# Patient Record
Sex: MALE | Race: WHITE | Employment: FULL TIME | URBAN - NONMETROPOLITAN AREA
[De-identification: names, ages, dates, MRNs, and addresses within clinical notes are randomized per-mention and may not be internally consistent; named-entity substitution may affect disease eponyms.]

---

## 2017-08-07 ENCOUNTER — ANESTHESIA EVENT (OUTPATIENT)
Dept: EMERGENCY MEDICINE | Facility: HOSPITAL | Age: 42
End: 2017-08-07
Payer: COMMERCIAL

## 2017-08-07 ENCOUNTER — HOSPITAL ENCOUNTER (EMERGENCY)
Facility: HOSPITAL | Age: 42
Discharge: HOME | End: 2017-08-07
Payer: COMMERCIAL

## 2017-08-07 ENCOUNTER — ANESTHESIA (OUTPATIENT)
Dept: EMERGENCY MEDICINE | Facility: HOSPITAL | Age: 42
End: 2017-08-07
Payer: COMMERCIAL

## 2017-08-07 VITALS
DIASTOLIC BLOOD PRESSURE: 79 MMHG | WEIGHT: 180 LBS | RESPIRATION RATE: 14 BRPM | SYSTOLIC BLOOD PRESSURE: 135 MMHG | TEMPERATURE: 98.4 F | OXYGEN SATURATION: 100 %

## 2017-08-07 DIAGNOSIS — M79.10 MYALGIA: ICD-10-CM

## 2017-08-07 DIAGNOSIS — R50.9 FEVER, UNSPECIFIED: ICD-10-CM

## 2017-08-07 LAB
ALBUMIN SERPL-MCNC: 3.6 G/DL (ref 3.4–5)
ALBUMIN UR-MCNC: 30 MG/DL
ALP SERPL-CCNC: 88 U/L (ref 40–150)
ALT SERPL W P-5'-P-CCNC: 58 U/L (ref 0–70)
ANION GAP SERPL CALCULATED.3IONS-SCNC: 6 MMOL/L (ref 3–14)
APPEARANCE CSF: CLEAR
APPEARANCE UR: CLEAR
AST SERPL W P-5'-P-CCNC: 43 U/L (ref 0–45)
BACTERIA #/AREA URNS HPF: ABNORMAL /HPF
BASOPHILS # BLD AUTO: 0 10E9/L (ref 0–0.2)
BASOPHILS NFR BLD AUTO: 0.5 %
BILIRUB SERPL-MCNC: 1.1 MG/DL (ref 0.2–1.3)
BILIRUB UR QL STRIP: NEGATIVE
BUN SERPL-MCNC: 20 MG/DL (ref 7–30)
CALCIUM SERPL-MCNC: 8.4 MG/DL (ref 8.5–10.1)
CHLORIDE SERPL-SCNC: 103 MMOL/L (ref 94–109)
CO2 SERPL-SCNC: 26 MMOL/L (ref 20–32)
COLOR CSF: COLORLESS
COLOR UR AUTO: YELLOW
CREAT SERPL-MCNC: 0.76 MG/DL (ref 0.66–1.25)
CRP SERPL-MCNC: 88 MG/L (ref 0–8)
DEPRECATED S PYO AG THROAT QL EIA: NORMAL
DIFFERENTIAL METHOD BLD: ABNORMAL
EOSINOPHIL # BLD AUTO: 0 10E9/L (ref 0–0.7)
EOSINOPHIL NFR BLD AUTO: 0.9 %
ERYTHROCYTE [DISTWIDTH] IN BLOOD BY AUTOMATED COUNT: 12.1 % (ref 10–15)
GFR SERPL CREATININE-BSD FRML MDRD: ABNORMAL ML/MIN/1.7M2
GLUCOSE CSF-MCNC: 63 MG/DL (ref 40–70)
GLUCOSE SERPL-MCNC: 116 MG/DL (ref 70–99)
GLUCOSE UR STRIP-MCNC: 300 MG/DL
GRAM STN SPEC: NORMAL
HCT VFR BLD AUTO: 41.2 % (ref 40–53)
HGB BLD-MCNC: 14.9 G/DL (ref 13.3–17.7)
HGB UR QL STRIP: NEGATIVE
IMM GRANULOCYTES # BLD: 0 10E9/L (ref 0–0.4)
IMM GRANULOCYTES NFR BLD: 0.5 %
KETONES UR STRIP-MCNC: NEGATIVE MG/DL
LACTATE SERPL-SCNC: 1.5 MMOL/L (ref 0.4–2)
LEUKOCYTE ESTERASE UR QL STRIP: NEGATIVE
LYMPHOCYTES # BLD AUTO: 0.5 10E9/L (ref 0.8–5.3)
LYMPHOCYTES NFR BLD AUTO: 12.2 %
MCH RBC QN AUTO: 30.9 PG (ref 26.5–33)
MCHC RBC AUTO-ENTMCNC: 36.2 G/DL (ref 31.5–36.5)
MCV RBC AUTO: 86 FL (ref 78–100)
MICRO REPORT STATUS: NORMAL
MICRO REPORT STATUS: NORMAL
MONOCYTES # BLD AUTO: 0.6 10E9/L (ref 0–1.3)
MONOCYTES NFR BLD AUTO: 13.3 %
MUCOUS THREADS #/AREA URNS LPF: PRESENT /LPF
NEUTROPHILS # BLD AUTO: 3.2 10E9/L (ref 1.6–8.3)
NEUTROPHILS NFR BLD AUTO: 72.6 %
NITRATE UR QL: NEGATIVE
NRBC # BLD AUTO: 0 10*3/UL
NRBC BLD AUTO-RTO: 0 /100
PH UR STRIP: 5.5 PH (ref 4.7–8)
PLATELET # BLD AUTO: 138 10E9/L (ref 150–450)
POTASSIUM SERPL-SCNC: 4.2 MMOL/L (ref 3.4–5.3)
PROT CSF-MCNC: 47 MG/DL (ref 15–60)
PROT SERPL-MCNC: 7 G/DL (ref 6.8–8.8)
RBC # BLD AUTO: 4.82 10E12/L (ref 4.4–5.9)
RBC # CSF MANUAL: 0 /UL (ref 0–2)
RBC #/AREA URNS AUTO: 1 /HPF (ref 0–2)
SODIUM SERPL-SCNC: 135 MMOL/L (ref 133–144)
SP GR UR STRIP: 1.02 (ref 1–1.03)
SPECIMEN SOURCE: NORMAL
SPECIMEN SOURCE: NORMAL
TUBE # CSF: 4 #
URN SPEC COLLECT METH UR: ABNORMAL
UROBILINOGEN UR STRIP-MCNC: NORMAL MG/DL (ref 0–2)
WBC # BLD AUTO: 4.4 10E9/L (ref 4–11)
WBC # CSF MANUAL: 0 /UL (ref 0–5)
WBC #/AREA URNS AUTO: 2 /HPF (ref 0–2)

## 2017-08-07 PROCEDURE — 87040 BLOOD CULTURE FOR BACTERIA: CPT

## 2017-08-07 PROCEDURE — 25000132 ZZH RX MED GY IP 250 OP 250 PS 637

## 2017-08-07 PROCEDURE — 25000128 H RX IP 250 OP 636: Performed by: FAMILY MEDICINE

## 2017-08-07 PROCEDURE — 82945 GLUCOSE OTHER FLUID: CPT

## 2017-08-07 PROCEDURE — 62270 DX LMBR SPI PNXR: CPT

## 2017-08-07 PROCEDURE — 99284 EMERGENCY DEPT VISIT MOD MDM: CPT | Mod: 25

## 2017-08-07 PROCEDURE — 37000011 ZZH ANESTHESIA WARD SERVICE: Performed by: NURSE ANESTHETIST, CERTIFIED REGISTERED

## 2017-08-07 PROCEDURE — 96360 HYDRATION IV INFUSION INIT: CPT | Mod: 59

## 2017-08-07 PROCEDURE — 80053 COMPREHEN METABOLIC PANEL: CPT | Performed by: FAMILY MEDICINE

## 2017-08-07 PROCEDURE — 84157 ASSAY OF PROTEIN OTHER: CPT

## 2017-08-07 PROCEDURE — 99284 EMERGENCY DEPT VISIT MOD MDM: CPT

## 2017-08-07 PROCEDURE — 86617 LYME DISEASE ANTIBODY: CPT | Mod: 59

## 2017-08-07 PROCEDURE — 25000128 H RX IP 250 OP 636

## 2017-08-07 PROCEDURE — 87798 DETECT AGENT NOS DNA AMP: CPT

## 2017-08-07 PROCEDURE — 87880 STREP A ASSAY W/OPTIC: CPT

## 2017-08-07 PROCEDURE — 86618 LYME DISEASE ANTIBODY: CPT | Performed by: FAMILY MEDICINE

## 2017-08-07 PROCEDURE — 71020 ZZHC CHEST TWO VIEWS, FRONT/LAT: CPT | Mod: TC

## 2017-08-07 PROCEDURE — 83605 ASSAY OF LACTIC ACID: CPT | Performed by: FAMILY MEDICINE

## 2017-08-07 PROCEDURE — 86617 LYME DISEASE ANTIBODY: CPT

## 2017-08-07 PROCEDURE — 87205 SMEAR GRAM STAIN: CPT

## 2017-08-07 PROCEDURE — 96361 HYDRATE IV INFUSION ADD-ON: CPT | Mod: 59

## 2017-08-07 PROCEDURE — 87070 CULTURE OTHR SPECIMN AEROBIC: CPT | Mod: 59

## 2017-08-07 PROCEDURE — 86140 C-REACTIVE PROTEIN: CPT | Performed by: FAMILY MEDICINE

## 2017-08-07 PROCEDURE — 85025 COMPLETE CBC W/AUTO DIFF WBC: CPT | Performed by: FAMILY MEDICINE

## 2017-08-07 PROCEDURE — 36415 COLL VENOUS BLD VENIPUNCTURE: CPT

## 2017-08-07 PROCEDURE — 87081 CULTURE SCREEN ONLY: CPT

## 2017-08-07 PROCEDURE — 81001 URINALYSIS AUTO W/SCOPE: CPT

## 2017-08-07 RX ORDER — LIDOCAINE 40 MG/G
CREAM TOPICAL
Status: DISCONTINUED | OUTPATIENT
Start: 2017-08-07 | End: 2017-08-07 | Stop reason: HOSPADM

## 2017-08-07 RX ORDER — SODIUM CHLORIDE 9 MG/ML
1000 INJECTION, SOLUTION INTRAVENOUS CONTINUOUS
Status: DISCONTINUED | OUTPATIENT
Start: 2017-08-07 | End: 2017-08-07 | Stop reason: HOSPADM

## 2017-08-07 RX ORDER — LIDOCAINE 40 MG/G
CREAM TOPICAL
Status: DISCONTINUED | OUTPATIENT
Start: 2017-08-07 | End: 2017-08-07

## 2017-08-07 RX ORDER — ACETAMINOPHEN 325 MG/1
975 TABLET ORAL ONCE
Status: COMPLETED | OUTPATIENT
Start: 2017-08-07 | End: 2017-08-07

## 2017-08-07 RX ORDER — IBUPROFEN 800 MG/1
800 TABLET, FILM COATED ORAL ONCE
Status: COMPLETED | OUTPATIENT
Start: 2017-08-07 | End: 2017-08-07

## 2017-08-07 RX ADMIN — IBUPROFEN 800 MG: 800 TABLET ORAL at 10:21

## 2017-08-07 RX ADMIN — ACETAMINOPHEN 975 MG: 325 TABLET, FILM COATED ORAL at 10:21

## 2017-08-07 RX ADMIN — SODIUM CHLORIDE 1000 ML: 9 INJECTION, SOLUTION INTRAVENOUS at 10:58

## 2017-08-07 RX ADMIN — SODIUM CHLORIDE 1000 ML: 9 INJECTION, SOLUTION INTRAVENOUS at 10:34

## 2017-08-07 RX ADMIN — SODIUM CHLORIDE 1000 ML: 9 INJECTION, SOLUTION INTRAVENOUS at 11:47

## 2017-08-07 ASSESSMENT — ENCOUNTER SYMPTOMS
ABDOMINAL PAIN: 0
NECK PAIN: 1
HEADACHES: 0
FATIGUE: 1
ACTIVITY CHANGE: 1
SHORTNESS OF BREATH: 0
COLOR CHANGE: 0
NECK STIFFNESS: 1
ARTHRALGIAS: 1
MYALGIAS: 1
CONFUSION: 0
APPETITE CHANGE: 1
FEVER: 1
DIFFICULTY URINATING: 0
JOINT SWELLING: 1
EYE REDNESS: 0
BACK PAIN: 1

## 2017-08-07 NOTE — ANESTHESIA PREPROCEDURE EVALUATION
Anesthesia Plan  Procedure only, no anesthetic delivered    History & Physical Review  History and physical reviewed and following examination; no interval change.    ASA Status:  2 .        Plan for Other (Lumbar puncture) with Other induction. Maintenance will be Other.      Discussed risks and benefits of spinal anesthetic with patient including itching, sore back, infection, hematoma, spinal headache, CV complications, nerve damage, inability to place, conversion to general anesthesia, loss of airway, and death. Pt wishes to proceed.       Postoperative Care      Consents  Anesthetic plan, risks, benefits and alternatives discussed with:  Patient..                          .

## 2017-08-07 NOTE — DISCHARGE INSTRUCTIONS
See attached for home care  Rest, ice, gentle stretching  Tylenol, ibuprofen for pain  You will be notified of lyme screen once it returns  F/U with PCP if not improving or back to base line in 1 day  Return to  with worsening symptoms.       General Neck and Back Pain    Both neck and back pain are usually caused by injury to the muscles or ligaments of the spine. Sometimes the disks that separate each bone of the spine may cause pain by pressing on a nearby nerve. Back and neck pain may appear after a sudden twisting or bending force (such as in a car accident), or sometimes after a simple awkward movement. In either case, muscle spasm is often present and adds to the pain.  Acute neck and back pain usually gets better in 1 to 2 weeks. Pain related to disk disease, arthritis in the spinal joints or spinal stenosis (narrowing of the spinal canal) can become chronic and last for months or years.  Back and neck pain are common problems. Most people feel better in 1 or 2 weeks, and most of the rest in 1 to 2 months. Most people can remain active.  People experience and describe pain differently.    Pain can be sharp, stabbing, shooting, aching, cramping, or burning    Movement, standing, bending, lifting, sitting, or walking may worsen the pain    Pain can be localized to one spot or area, or it can be more generalized    Pain can spread or radiate upwards, downwards, to the front, or go down your arms    Muscle spasm may occur.  Most of the time mechanical problems with the muscles or spine cause the pain. it is usually caused by an injury, whether known or not, to the muscles or ligaments. While illnesses can cause back pain, it is usually not caused by a serious illness. Pain is usually related to physical activity, whether sports, exercise, work, or normal activity. Sometimes it can occur without an identifiable cause. This can happen simply by stretching or moving wrong, without noting pain at the time. Other  causes include:    Overexertion, lifting, pushing, pulling incorrectly or too aggressively.    Sudden twisting, bending or stretching from an accident (car or fall), or accidental movement.    Poor posture    Poor conditioning, lack of regular exercise    Spinal disc disease or arthritis    Stress    Pregnancy, or illness like appendicitis, bladder or kidney infection, pelvic infections   Home care    For neck pain: Use a comfortable pillow that supports the head and keeps the spine in a neutral position. The position of the head should not be tilted forward or backward.    When in bed, try to find a position of comfort. A firm mattress is best. Try lying flat on your back with pillows under your knees. You can also try lying on your side with your knees bent up towards your chest and a pillow between your knees.    At first, do not try to stretch out the sore spots. If there is a strain, it is not like the good soreness you get after exercising without an injury. In this case, stretching may make it worse.    Avoid prolonged sitting, long car rides or travel. This puts more stress on the lower back than standing or walking.    During the first 24 to 72 hours after an injury, apply an ice pack to the painful area for 20 minutes and then remove it for 20 minutes over a period of 60 to 90 minutes or several times a day.     You can alternate ice and heat therapies. Talk with your healthcare provider about the best treatment for your back or neck pain. As a safety precaution, do not use a heating pad at bedtime. Sleeping with a heating pad can lead to skin burns or tissue damage.    Therapeutic massage can help relax the back and neck muscles without stretching them.    Be aware of safe lifting methods and do not lift anything over 15 pounds until all the pain is gone.  Medications  Talk to your healthcare provider before using medicine, especially if you have other medical problems or are taking other  medicines.    You may use over-the-counter medicine to control pain, unless another pain medicine was prescribed. If you have chronic conditions like diabetes, liver or kidney disease, stomach ulcers,  gastrointestinal bleeding, or are taking blood thinner medicines.    Be careful if you are given pain medicines, narcotics, or medicine for muscle spasm. They can cause drowsiness, and can affect your coordination, reflexes, and judgment. Do not drive or operate heavy machinery.  Follow-up care  Follow up with your healthcare provider, or as advised. Physical therapy or further tests may be needed.  If X-rays were taken, you will be notified of any new findings that may affect your care.  Call 911  Seek emergency medical care if any of the following occur:    Trouble breathing    Confusion    Very drowsy or trouble awakening    Fainting or loss of consciousness    Rapid or very slow heart rate    Loss of bowel or bladder control  When to seek medical advice  Call your healthcare provider right away if any of these occur:    Pain becomes worse or spreads into your arms or legs    Weakness, numbness or pain in one or both arms or legs    Numbness in the groin area    Difficulty walking    Fever of 100.4 F (38 C) or higher, or as directed by your healthcare provider  Date Last Reviewed: 7/1/2016 2000-2017 [employer]. [address] [place] [address], [place] [address]. All rights reserved. This information is not intended as a substitute for professional medical care. Always follow your healthcare professional's instructions.

## 2017-08-07 NOTE — ANESTHESIA POSTPROCEDURE EVALUATION
Patient: Jordi Kim    * No procedures listed *    Diagnosis:* No pre-op diagnosis entered *  Diagnosis Additional Information: No value filed.    Anesthesia Type:  Other    Note:  Anesthesia Post Evaluation    Patient location during evaluation: Bedside  Patient participation: Able to fully participate in evaluation  Level of consciousness: awake and alert  Pain management: adequate  Airway patency: patent  Cardiovascular status: acceptable  Respiratory status: acceptable  Hydration status: acceptable  PONV: none     Anesthetic complications: None          Last vitals:  Vitals:    08/07/17 1159 08/07/17 1200 08/07/17 1243   BP:  127/81    Resp: 19 16    Temp:   97.1  F (36.2  C)   SpO2: 98% 98%          Electronically Signed By: KEANU Corrigan CRNA  August 7, 2017  12:49 PM

## 2017-08-07 NOTE — ED NOTES
Patient verbalizes understanding of discharge instructions. Afebrile. Denies pain at this time. GCS 15.

## 2017-08-07 NOTE — ED AVS SNAPSHOT
[facility]    [address] [address]    Phone:  [phone]                                       Jordi Kim   MRN: [id number]    Department:  [facility]   Date of Visit:  8/7/2017           After Visit Summary Signature Page     I have received my discharge instructions, and my questions have been answered. I have discussed any challenges I see with this plan with the nurse or doctor.    ..........................................................................................................................................  Patient/Patient Representative Signature      ..........................................................................................................................................  Patient Representative Print Name and Relationship to Patient    ..................................................               ................................................  Date                                            Time    ..........................................................................................................................................  Reviewed by Signature/Title    ...................................................              ..............................................  Date                                                            Time

## 2017-08-07 NOTE — ED PROVIDER NOTES
"  History     Chief Complaint   Patient presents with     Back Pain     started Friday. low back with shooting pains down my left leg.      Neck Pain     states the neck pain started Friday. \"When I turn my head to the right I get shooting pains up into my head.\" denies any recent injuries or trauma.      The history is provided by the patient. No  was used.     Jordi Kim is a 41 year old male who presents to ED via private car for evaluation of fatigue, neck and back pain, headache. Onset of sx 2-3 days ago, progressively worsening. Associated myalgias, arthralgias, anorexia. No recent travel, reports tick bite earlier in the summer. Denies injury. Reports to hx of low back pain, neck pain is new. Denies fever, no n/v/d, no rash.     I have reviewed the Medications, Allergies, Past Medical and Surgical History, and Social History in the Epic system.    Allergies: No Known Allergies    Review of Systems   Constitutional: Positive for activity change, appetite change, fatigue and fever.   HENT: Negative for congestion.    Eyes: Negative for redness.   Respiratory: Negative for shortness of breath.    Cardiovascular: Negative for chest pain.   Gastrointestinal: Negative for abdominal pain.   Genitourinary: Negative for difficulty urinating.   Musculoskeletal: Positive for arthralgias, back pain, joint swelling, myalgias, neck pain and neck stiffness.   Skin: Negative for color change.   Neurological: Negative for headaches.   Psychiatric/Behavioral: Negative for confusion.       Physical Exam   BP: 131/74  Heart Rate: 108  Temp: (!) 102.1  F (38.9  C)  Resp: 18  Weight: 81.6 kg (180 lb)  SpO2: 100 %  Physical Exam   Constitutional: He is oriented to person, place, and time. No distress.   Multiple tatoos   Neck: Trachea normal. Muscular tenderness present. Decreased range of motion present. Kernig's sign noted.   Pain with downward flexion and right side rotation.    Cardiovascular: Regular " rhythm.  Tachycardia present.    Pulmonary/Chest: Effort normal and breath sounds normal. No respiratory distress.   Abdominal: Soft. Bowel sounds are normal. He exhibits no distension. There is no tenderness.   Musculoskeletal:        Cervical back: He exhibits tenderness. He exhibits normal range of motion.        Lumbar back: He exhibits decreased range of motion and tenderness.   Neurological: He is alert and oriented to person, place, and time.   Skin: Skin is warm and dry. No rash noted. He is not diaphoretic.   Nursing note and vitals reviewed.      ED Course     Procedures      Labs Ordered and Resulted from Time of ED Arrival Up to the Time of Departure from the ED   COMPREHENSIVE METABOLIC PANEL - Abnormal; Notable for the following:        Result Value    Glucose 116 (*)     Calcium 8.4 (*)     All other components within normal limits   CBC WITH PLATELETS DIFFERENTIAL - Abnormal; Notable for the following:     Platelet Count 138 (*)     Absolute Lymphocytes 0.5 (*)     All other components within normal limits   CRP INFLAMMATION - Abnormal; Notable for the following:     CRP Inflammation 88.0 (*)     All other components within normal limits   LACTIC ACID   LYME DISEASE MARKIE WITH REFLEX TO WB SERUM   CARDIAC CONTINUOUS MONITORING   NURSING DRAW AND HOLD   NURSING DRAW AND HOLD   NURSING DRAW AND HOLD   PERIPHERAL IV CATHETER   PULSE OXIMETRY NURSING   RAPID STREP SCREEN   CELL COUNT WITH DIFFERENTIAL CSF   LYME IGG AND IGM CSF IMMUNOBLOT     CHEST X-RAY    HISTORY:  A 41-year-old with fever.    FINDINGS:  Two views of chest were obtained.  Heart size and pulmonary  vascularity are within normal limits.  Lungs are clear on both views.    IMPRESSION: CLEAR CHEST.  Exam Date: Aug 07, 2017 11:09:33 AM  Author: ALEXA PRATT    Assessments & Plan (with Medical Decision Making)   Pt presents with stiff neck, low back pain, fever, 2 days in duration. Exam and labs as above.   Tylenol, ibuprofen given for  pain, IV fluids initiated.   Labs remarkable for CRP 88.   Consulted with Dr. Puckett, given fever, high crp and neck pain, lumbar puncture advised and performed by anesthesia. Clear returns.   Pt observed for 3+ hours, temp returned to normal, neck and back pain improved. Likely viral syndrome, although possible lyme, lab pending. Pt requesting discharge. Given stable labs, exam, will d/c to home with epic discharge instructions. Pt verbalizes understanding and agrees with plan.  Epic discharge instructions given. Pt discharged in stable condition.     I have reviewed the nursing notes.    I have reviewed the findings, diagnosis, plan and need for follow up with the patient.    Discharge Medication List as of 8/7/2017  1:53 PM          Final diagnoses:   Myalgia   Fever, unspecified     See attached for home care  Rest, ice, gentle stretching  Tylenol, ibuprofen for pain  You will be notified of lyme screen once it returns  F/U with PCP if not improving or back to base line in 1 day  Return to  with worsening symptoms.       8/7/2017   [facility]     Rachel Welch APRN FNP  08/08/17 1725

## 2017-08-07 NOTE — ANESTHESIA PROCEDURE NOTES
Peripheral nerve/Neuraxial procedure note : lumbar puncture  Pre-Procedure  Performed by   Referred by ER PHYSICIAN  Location: ED    Procedure Times:8/7/2017 12:30 PM and 8/7/2017 12:35 PM  Pre-Anesthestic Checklist: patient identified, IV checked, site marked, risks and benefits discussed, informed consent, monitors and equipment checked, pre-op evaluation and at physician/surgeon's request    Timeout  Correct Patient: Yes   Correct Procedure: Yes   Correct Site: Yes   Correct Laterality: N/A   Correct Position: Yes   Site Marked: N/A   .   Procedure Documentation  ASA 2  Diagnosis:possible meningitis.    Procedure:    Lumbar puncture.  Insertion Site:L3-4  (midline approach)      Patient Prep;mask, sterile gloves, chlorhexidine gluconate and isopropyl alcohol, patient draped.  .  Needle: Sprotte Spinal Needle (gauge): 22  # of attempts: 1 and  # of redirects:  1 No introducer used .       Assessment/Narrative  Paresthesias: Resolved.  .  .  4 mL of clear CSF fluid removed while sitting   .

## 2017-08-07 NOTE — ED AVS SNAPSHOT
[facility]    [address]    [place] [address]    Phone:  [phone]                                       Jordi Kim   MRN: [id number]    Department:  [facility]   Date of Visit:  8/7/2017           Patient Information     Date Of Birth          1975        Your diagnoses for this visit were:     Myalgia     Fever, unspecified        You were seen by Rachel Welch APRN FNP.      Follow-up Information     Follow up with None In 1 day.    Why:  recheck        Discharge Instructions         See attached for home care  Rest, ice, gentle stretching  Tylenol, ibuprofen for pain  You will be notified of lyme screen once it returns  F/U with PCP if not improving or back to base line in 1 day  Return to  with worsening symptoms.       General Neck and Back Pain    Both neck and back pain are usually caused by injury to the muscles or ligaments of the spine. Sometimes the disks that separate each bone of the spine may cause pain by pressing on a nearby nerve. Back and neck pain may appear after a sudden twisting or bending force (such as in a car accident), or sometimes after a simple awkward movement. In either case, muscle spasm is often present and adds to the pain.  Acute neck and back pain usually gets better in 1 to 2 weeks. Pain related to disk disease, arthritis in the spinal joints or spinal stenosis (narrowing of the spinal canal) can become chronic and last for months or years.  Back and neck pain are common problems. Most people feel better in 1 or 2 weeks, and most of the rest in 1 to 2 months. Most people can remain active.  People experience and describe pain differently.    Pain can be sharp, stabbing, shooting, aching, cramping, or burning    Movement, standing, bending, lifting, sitting, or walking may worsen the pain    Pain can be localized to one spot or area, or it can be more generalized    Pain can spread or radiate upwards, downwards, to the  front, or go down your arms    Muscle spasm may occur.  Most of the time mechanical problems with the muscles or spine cause the pain. it is usually caused by an injury, whether known or not, to the muscles or ligaments. While illnesses can cause back pain, it is usually not caused by a serious illness. Pain is usually related to physical activity, whether sports, exercise, work, or normal activity. Sometimes it can occur without an identifiable cause. This can happen simply by stretching or moving wrong, without noting pain at the time. Other causes include:    Overexertion, lifting, pushing, pulling incorrectly or too aggressively.    Sudden twisting, bending or stretching from an accident (car or fall), or accidental movement.    Poor posture    Poor conditioning, lack of regular exercise    Spinal disc disease or arthritis    Stress    Pregnancy, or illness like appendicitis, bladder or kidney infection, pelvic infections   Home care    For neck pain: Use a comfortable pillow that supports the head and keeps the spine in a neutral position. The position of the head should not be tilted forward or backward.    When in bed, try to find a position of comfort. A firm mattress is best. Try lying flat on your back with pillows under your knees. You can also try lying on your side with your knees bent up towards your chest and a pillow between your knees.    At first, do not try to stretch out the sore spots. If there is a strain, it is not like the good soreness you get after exercising without an injury. In this case, stretching may make it worse.    Avoid prolonged sitting, long car rides or travel. This puts more stress on the lower back than standing or walking.    During the first 24 to 72 hours after an injury, apply an ice pack to the painful area for 20 minutes and then remove it for 20 minutes over a period of 60 to 90 minutes or several times a day.     You can alternate ice and heat therapies. Talk with  your healthcare provider about the best treatment for your back or neck pain. As a safety precaution, do not use a heating pad at bedtime. Sleeping with a heating pad can lead to skin burns or tissue damage.    Therapeutic massage can help relax the back and neck muscles without stretching them.    Be aware of safe lifting methods and do not lift anything over 15 pounds until all the pain is gone.  Medications  Talk to your healthcare provider before using medicine, especially if you have other medical problems or are taking other medicines.    You may use over-the-counter medicine to control pain, unless another pain medicine was prescribed. If you have chronic conditions like diabetes, liver or kidney disease, stomach ulcers,  gastrointestinal bleeding, or are taking blood thinner medicines.    Be careful if you are given pain medicines, narcotics, or medicine for muscle spasm. They can cause drowsiness, and can affect your coordination, reflexes, and judgment. Do not drive or operate heavy machinery.  Follow-up care  Follow up with your healthcare provider, or as advised. Physical therapy or further tests may be needed.  If X-rays were taken, you will be notified of any new findings that may affect your care.  Call 911  Seek emergency medical care if any of the following occur:    Trouble breathing    Confusion    Very drowsy or trouble awakening    Fainting or loss of consciousness    Rapid or very slow heart rate    Loss of bowel or bladder control  When to seek medical advice  Call your healthcare provider right away if any of these occur:    Pain becomes worse or spreads into your arms or legs    Weakness, numbness or pain in one or both arms or legs    Numbness in the groin area    Difficulty walking    Fever of 100.4 F (38 C) or higher, or as directed by your healthcare provider  Date Last Reviewed: 7/1/2016 2000-2017 [employer]. [address] [place] [id number]. All rights  reserved. This information is not intended as a substitute for professional medical care. Always follow your healthcare professional's instructions.             Review of your medicines      Our records show that you are taking the medicines listed below. If these are incorrect, please call your family doctor or clinic.        Dose / Directions Last dose taken    IBUPROFEN PO   Dose:  800 mg        Take 800 mg by mouth every 6 hours as needed for moderate pain   Refills:  0                Procedures and tests performed during your visit     Procedure/Test Number of Times Performed    Beta strep group A culture 1    Blood culture 2    CBC with platelets differential 1    CRP inflammation 1    CSF Culture Aerobic Bacterial 1    Cardiac Continuous Monitoring 1    Cell count with differential CSF: Tube 4 1    Comprehensive metabolic panel 1    Draw and hold 3    Glucose CSF: Tube 1 1    Gram stain 1    Lactic acid 1    Lyme Disease Brittaney with reflex to WB Serum 1    Lyme IgG and IgM CSF Immunoblot: Tube 3 1    Peripheral IV: Standard 1    Protein total CSF: Tube 1 1    Pulse oximetry nursing 1    Rapid strep screen 1    XR Chest 2 Views 1      Orders Needing Specimen Collection     Ordered          08/07/17 1009  UA reflex to Microscopic and Culture - STAT, Prio: STAT, Needs to be Collected     Scheduled Task Status   08/07/17 1010 Collect UA reflex to Microscopic and Culture Open   Order Class:  PCU Collect                  Pending Results     Date and Time Order Name Status Description    8/7/2017 1243 Lyme IgG and IgM CSF Immunoblot: Tube 3 In process     8/7/2017 1243 CSF Culture Aerobic Bacterial In process     8/7/2017 1243 Gram stain In process     8/7/2017 1243 Protein total CSF: Tube 1 In process     8/7/2017 1243 Glucose CSF: Tube 1 In process     8/7/2017 1130 Beta strep group A culture In process     8/7/2017 1032 Blood culture In process     8/7/2017 1009 Lyme Disease Brittaney with reflex to WB Serum In process   "   2017 1009 Blood culture In process     2017 1009 XR Chest 2 Views Preliminary             Pending Culture Results     Date and Time Order Name Status Description    2017 1243 Lyme IgG and IgM CSF Immunoblot: Tube 3 In process     2017 1243 CSF Culture Aerobic Bacterial In process     2017 1243 Gram stain In process     2017 1243 Protein total CSF: Tube 1 In process     2017 1243 Glucose CSF: Tube 1 In process     2017 1130 Beta strep group A culture In process     2017 1032 Blood culture In process     2017 1009 Blood culture In process             Thank you for choosing [facility]       Thank you for choosing [facility] for your care. Our goal is always to provide you with excellent care. Hearing back from our patients is one way we can continue to improve our services. Please take a few minutes to complete the written survey that you may receive in the mail after you visit with us. Thank you!        [employer] Information     [employer] lets you send messages to your doctor, view your test results, renew your prescriptions, schedule appointments and more. To sign up, go to [email or web address] . Click on \"Log in\" on the left side of the screen, which will take you to the Welcome page. Then click on \"Sign up Now\" on the right side of the page.     You will be asked to enter the access code listed below, as well as some personal information. Please follow the directions to create your username and password.     Your access code is: [email or web address]  Expires: 2017  1:53 PM     Your access code will  in 90 days. If you need help or a new code, please call your [facility] clinic or [phone].        Care EveryWhere ID     This is your Care EveryWhere ID. This could be used by other organizations to access your [facility] medical records  [id number]        Equal Access to Services     MARGE CAMP AH: gina Hernadez, laney sepulveda, " pradeep krishnamurthy'aan ah. So [facility] [phone].    ATENCIÓN: Si habla español, tiene a lama disposición servicios gratuitos de asistencia lingüística. Llame al [phone].    We comply with applicable federal civil rights laws and [place] laws. We do not discriminate on the basis of race, color, national origin, age, disability sex, sexual orientation or gender identity.            After Visit Summary       This is your record. Keep this with you and show to your community pharmacist(s) and doctor(s) at your next visit.

## 2017-08-07 NOTE — ED NOTES
"Presents to ER with c/o \"genrealized aches and pains, more so in joints and when turning head to right.\" Started last Friday and did take Ibuprofen at home with no relief of symptoms; symptoms just got progressively worse, rating pain 8/10. Reports decreased appetite and not eating, but no nausea. See assessments. Call light placed within reach.   "

## 2017-08-08 LAB — B BURGDOR IGG+IGM SER QL: 0.04 (ref 0–0.89)

## 2017-08-09 ENCOUNTER — ANESTHESIA EVENT (OUTPATIENT)
Dept: EMERGENCY MEDICINE | Facility: HOSPITAL | Age: 42
End: 2017-08-09
Payer: COMMERCIAL

## 2017-08-09 ENCOUNTER — ANESTHESIA (OUTPATIENT)
Dept: EMERGENCY MEDICINE | Facility: HOSPITAL | Age: 42
End: 2017-08-09
Payer: COMMERCIAL

## 2017-08-09 ENCOUNTER — HOSPITAL ENCOUNTER (EMERGENCY)
Facility: HOSPITAL | Age: 42
Discharge: HOME | End: 2017-08-09
Payer: COMMERCIAL

## 2017-08-09 VITALS
DIASTOLIC BLOOD PRESSURE: 79 MMHG | WEIGHT: 170 LBS | OXYGEN SATURATION: 100 % | TEMPERATURE: 98.1 F | SYSTOLIC BLOOD PRESSURE: 133 MMHG | RESPIRATION RATE: 16 BRPM

## 2017-08-09 DIAGNOSIS — G97.1 POST LUMBAR PUNCTURE HEADACHE: ICD-10-CM

## 2017-08-09 LAB
BACTERIA SPEC CULT: NORMAL
BASOPHILS # BLD AUTO: 0 10E9/L (ref 0–0.2)
BASOPHILS NFR BLD AUTO: 0.5 %
CRP SERPL-MCNC: 123 MG/L (ref 0–8)
DIFFERENTIAL METHOD BLD: ABNORMAL
EOSINOPHIL # BLD AUTO: 0.1 10E9/L (ref 0–0.7)
EOSINOPHIL NFR BLD AUTO: 1.3 %
ERYTHROCYTE [DISTWIDTH] IN BLOOD BY AUTOMATED COUNT: 12.4 % (ref 10–15)
GLUCOSE BLDC GLUCOMTR-MCNC: 115 MG/DL (ref 70–99)
HCT VFR BLD AUTO: 39.5 % (ref 40–53)
HGB BLD-MCNC: 14.2 G/DL (ref 13.3–17.7)
IMM GRANULOCYTES # BLD: 0 10E9/L (ref 0–0.4)
IMM GRANULOCYTES NFR BLD: 0.3 %
LYMPHOCYTES # BLD AUTO: 0.5 10E9/L (ref 0.8–5.3)
LYMPHOCYTES NFR BLD AUTO: 8.8 %
MCH RBC QN AUTO: 30.9 PG (ref 26.5–33)
MCHC RBC AUTO-ENTMCNC: 35.9 G/DL (ref 31.5–36.5)
MCV RBC AUTO: 86 FL (ref 78–100)
MICRO REPORT STATUS: NORMAL
MONOCYTES # BLD AUTO: 0.5 10E9/L (ref 0–1.3)
MONOCYTES NFR BLD AUTO: 7.7 %
NEUTROPHILS # BLD AUTO: 5 10E9/L (ref 1.6–8.3)
NEUTROPHILS NFR BLD AUTO: 81.4 %
NRBC # BLD AUTO: 0 10*3/UL
NRBC BLD AUTO-RTO: 0 /100
PLATELET # BLD AUTO: 151 10E9/L (ref 150–450)
RBC # BLD AUTO: 4.6 10E12/L (ref 4.4–5.9)
SPECIMEN SOURCE: NORMAL
WBC # BLD AUTO: 6.1 10E9/L (ref 4–11)

## 2017-08-09 PROCEDURE — 99284 EMERGENCY DEPT VISIT MOD MDM: CPT

## 2017-08-09 PROCEDURE — 99284 EMERGENCY DEPT VISIT MOD MDM: CPT | Mod: 25

## 2017-08-09 PROCEDURE — 96360 HYDRATION IV INFUSION INIT: CPT

## 2017-08-09 PROCEDURE — 86140 C-REACTIVE PROTEIN: CPT

## 2017-08-09 PROCEDURE — 36415 COLL VENOUS BLD VENIPUNCTURE: CPT

## 2017-08-09 PROCEDURE — 25000132 ZZH RX MED GY IP 250 OP 250 PS 637

## 2017-08-09 PROCEDURE — 00000146 ZZHCL STATISTIC GLUCOSE BY METER IP

## 2017-08-09 PROCEDURE — 37000011 ZZH ANESTHESIA WARD SERVICE: Performed by: NURSE ANESTHETIST, CERTIFIED REGISTERED

## 2017-08-09 PROCEDURE — 62273 INJECT EPIDURAL PATCH: CPT | Performed by: NURSE ANESTHETIST, CERTIFIED REGISTERED

## 2017-08-09 PROCEDURE — 96361 HYDRATE IV INFUSION ADD-ON: CPT

## 2017-08-09 PROCEDURE — 25000128 H RX IP 250 OP 636

## 2017-08-09 PROCEDURE — 85025 COMPLETE CBC W/AUTO DIFF WBC: CPT

## 2017-08-09 RX ORDER — SODIUM CHLORIDE 9 MG/ML
INJECTION, SOLUTION INTRAVENOUS CONTINUOUS
Status: DISCONTINUED | OUTPATIENT
Start: 2017-08-09 | End: 2017-08-09 | Stop reason: HOSPADM

## 2017-08-09 RX ORDER — BUTALBITAL, ACETAMINOPHEN, CAFFEINE AND CODEINE PHOSPHATE 50; 325; 40; 30 MG/1; MG/1; MG/1; MG/1
2 CAPSULE ORAL EVERY 6 HOURS PRN
Qty: 20 CAPSULE | Refills: 0 | Status: SHIPPED | OUTPATIENT
Start: 2017-08-09 | End: 2017-08-16

## 2017-08-09 RX ORDER — BUTALBITAL, ACETAMINOPHEN, CAFFEINE AND CODEINE PHOSPHATE 50; 325; 40; 30 MG/1; MG/1; MG/1; MG/1
2 CAPSULE ORAL ONCE
Status: COMPLETED | OUTPATIENT
Start: 2017-08-09 | End: 2017-08-09

## 2017-08-09 RX ADMIN — SODIUM CHLORIDE: 9 INJECTION, SOLUTION INTRAVENOUS at 10:21

## 2017-08-09 RX ADMIN — BUTALBITAL, ACETAMINOPHEN, CAFFEINE, AND CODEINE PHOSPHATE 2 CAPSULE: 30; 50; 40; 325 CAPSULE ORAL at 10:53

## 2017-08-09 ASSESSMENT — ENCOUNTER SYMPTOMS
ABDOMINAL PAIN: 0
SHORTNESS OF BREATH: 0
HEADACHES: 1
EYE REDNESS: 0
COLOR CHANGE: 0
DIFFICULTY URINATING: 0
ARTHRALGIAS: 0
FEVER: 0
NECK STIFFNESS: 0
CONFUSION: 0

## 2017-08-09 NOTE — ED PROVIDER NOTES
History     Chief Complaint   Patient presents with     Headache     started early this morning. Was seen Monday for headache when he turned his head to the right.      Neck Pain     no relief with ice and ibuprofen.      The history is provided by the patient. No  was used.     Jordi Kim is a 41 year old male who presents to ED via private car for evaluation of headache. Onset of sx during the night. Worsened pain with sneeze, cough, standing.  Pt was seen here 2 days ago with neck pain, fever, elevated CRP, thus lumbar puncture was performed by anesthesia. Results came back clear. States the sx that brought him originally have resolved, this is a new type of headache. He has taken tylenol and ibuprofen PTA w/o improvement.     I have reviewed the Medications, Allergies, Past Medical and Surgical History, and Social History in the Epic system.    Allergies: No Known Allergies    Review of Systems   Constitutional: Negative for fever.   HENT: Negative for congestion.    Eyes: Negative for redness.   Respiratory: Negative for shortness of breath.    Cardiovascular: Negative for chest pain.   Gastrointestinal: Negative for abdominal pain.   Genitourinary: Negative for difficulty urinating.   Musculoskeletal: Negative for arthralgias and neck stiffness.   Skin: Negative for color change.   Neurological: Positive for headaches.   Psychiatric/Behavioral: Negative for confusion.       Physical Exam   BP: 124/70  Heart Rate: 92  Temp: 98.4  F (36.9  C)  Resp: 18  Weight: 77.1 kg (170 lb)  SpO2: 100 %  Physical Exam   Constitutional: He is oriented to person, place, and time. No distress.   HENT:   Head: Normocephalic and atraumatic.   Mouth/Throat: Oropharynx is clear and moist. No oropharyngeal exudate.   Eyes: Conjunctivae are normal. Pupils are equal, round, and reactive to light.   Neck: Normal range of motion. Neck supple.   Cardiovascular: Normal rate and regular rhythm.    No murmur  heard.  Pulmonary/Chest: Effort normal. No respiratory distress.   Abdominal: Soft. There is no tenderness.   Musculoskeletal: Normal range of motion.   Lymphadenopathy:     He has no cervical adenopathy.   Neurological: He is alert and oriented to person, place, and time.   Skin: Skin is warm and dry. He is not diaphoretic.   Nursing note and vitals reviewed.      ED Course     Procedures     Labs Ordered and Resulted from Time of ED Arrival Up to the Time of Departure from the ED   CBC WITH PLATELETS DIFFERENTIAL - Abnormal; Notable for the following:        Result Value    Hematocrit 39.5 (*)     Absolute Lymphocytes 0.5 (*)     All other components within normal limits   CRP INFLAMMATION - Abnormal; Notable for the following:     CRP Inflammation 123.0 (*)     All other components within normal limits   GLUCOSE BY METER - Abnormal; Notable for the following:     Glucose 115 (*)     All other components within normal limits       Assessments & Plan (with Medical Decision Making)   Pt presents with headache 2 days s/p lumbar puncture, worse when standing, cough, sneeze. Onset during the night. VSS, afebrile, non toxic. Labs noted as above, elevated CRP. Fioricet given, consulted with anesthesia.   Blood patch performed, please see notes by anesthesia.   Pt reported improved pain, observed for 2 hours. Pt discharged in stable condition with epic discharge instructions.   I have reviewed the nursing notes.    I have reviewed the findings, diagnosis, plan and need for follow up with the patient.    Discharge Medication List as of 8/9/2017 12:15 PM      START taking these medications    Details   butalbital-APAP-caffeine-codeine (FIORICET WITH CODEINE) -79-30 MG per capsule Take 2 capsules by mouth every 6 hours as needed for headaches or migraine, Disp-20 capsule, R-0, Local Print             Final diagnoses:   Post lumbar puncture headache     See attached form for home care  Rest, increase fluids  Take  tylenol/ibuprofen for pain  Increase fluids  Take Fioricet for more severe pain  F/u in 1 week if not back to base line  Return to ED with worsening sx.        8/9/2017   [facility]     Rachel Welch APRN FNP  08/10/17 0956

## 2017-08-09 NOTE — DISCHARGE INSTRUCTIONS
See attached form for home care  Rest, increase fluids  Take tylenol/ibuprofen for pain  Increase fluids  Take Fioricet for more severe pain  F/u in 1 week if not back to base line  Return to ED with worsening sx.

## 2017-08-09 NOTE — ED AVS SNAPSHOT
[facility]    [address] [address]    Phone:  [phone]                                       Jordi Kim   MRN: [id number]    Department[facility]   Date of Visit:  8/9/2017           Patient Information     Date Of Birth          1975        Your diagnoses for this visit were:     Post lumbar puncture headache        You were seen by Rachel Welch APRN FNP.      Follow-up Information     Follow up with PCP In 1 week.    Why:  if not improving or back to baseline        Follow up with [facility].    Specialty:  EMERGENCY MEDICINE    Why:  As needed, If symptoms worsen    Contact information:    [address] [address] [phone]    Additional information:    From [facility]: Take [address]. Turn left at [address]. Turn left at the first stoplight on [address]. At the first stop sign, take a right onto [address]. Take a left into the parking lot and continue through until you reach the North enterance of the building.       From [place]: Take [address]. Take the [address] ramp towards [place]. Turn left onto [address]. Take a slight right onto [address]. Turn left at the first stoplight on [address]. At the first stop sign, take a right onto [address]. Take a left into the parking lot and continue through until you reach the North enterance of the building.       From [place]: Take [address]. Take a right at [address]. At the first stop sign, take a right onto [address]. Take a left into the parking lot and continue through until you reach the North enterance of the building.         Discharge Instructions       See attached form for home care  Rest, increase fluids  Take tylenol/ibuprofen for pain  Increase fluids  Take Fioricet for more severe pain  F/u in 1 week if not back to base line  Return to ED with  worsening sx.        Discharge References/Attachments     HEADACHE AFTER SPINAL TAP (WITH PATCH) (ENGLISH)         Review of your medicines      START taking        Dose / Directions Last dose taken    butalbital-APAP-caffeine-codeine -65-30 MG per capsule   Commonly known as:  FIORICET WITH codeine   Dose:  2 capsule   Quantity:  20 capsule        Take 2 capsules by mouth every 6 hours as needed for headaches or migraine   Refills:  0          Our records show that you are taking the medicines listed below. If these are incorrect, please call your family doctor or clinic.        Dose / Directions Last dose taken    IBUPROFEN PO   Dose:  800 mg        Take 800 mg by mouth every 6 hours as needed for moderate pain   Refills:  0                Prescriptions were sent or printed at these locations (1 Prescription)                   [id number] - [place], [place] - [address] AT [facility] [address]   [address], [place] [address]    Telephone:  [phone]   Fax:  [phone]   Hours:                  Printed at Department/Unit printer (1 of 1)         butalbital-APAP-caffeine-codeine (FIORICET WITH CODEINE) -35-30 MG per capsule                Procedures and tests performed during your visit     CBC with platelets differential    CRP inflammation    Glucose by meter      Orders Needing Specimen Collection     None      Pending Results     Date and Time Order Name Status Description    8/7/2017 1450 Ehrlichia Anaplasma Sp by PCR In process     8/7/2017 1243 Lyme IgG and IgM CSF Immunoblot: Tube 3 In process     8/7/2017 1243 CSF Culture Aerobic Bacterial Preliminary     8/7/2017 1032 Blood culture Preliminary     8/7/2017 1009 Blood culture Preliminary             Pending Culture Results     Date and Time Order Name Status Description    8/7/2017 1243 Lyme IgG and IgM CSF Immunoblot: Tube 3 In process     8/7/2017 1243 CSF Culture Aerobic Bacterial Preliminary     8/7/2017 1032  Blood culture Preliminary     8/7/2017 1009 Blood culture Preliminary             Thank you for choosing [facility]       Thank you for choosing [facility] for your care. Our goal is always to provide you with excellent care. Hearing back from our patients is one way we can continue to improve our services. Please take a few minutes to complete the written survey that you may receive in the mail after you visit with us. Thank you!        [employer] Information     [employer] gives you secure access to your electronic health record. If you see a primary care provider, you can also send messages to your care team and make appointments. If you have questions, please call your primary care clinic.  If you do not have a primary care provider, please call [phone] and they will assist you.        Care EveryWhere ID     This is your Care EveryWhere ID. This could be used by other organizations to access your [facility] medical records  [id number]        Equal Access to Services     MARGE CAMP : Cordell Norton, gina herbert, pradeep baca. So [facility] [phone].    ATENCIÓN: Si habla español, tiene a lama disposición servicios gratuitos de asistencia lingüística. Llame al [phone].    We comply with applicable federal civil rights laws and [place] laws. We do not discriminate on the basis of race, color, national origin, age, disability sex, sexual orientation or gender identity.            After Visit Summary       This is your record. Keep this with you and show to your community pharmacist(s) and doctor(s) at your next visit.

## 2017-08-09 NOTE — ED AVS SNAPSHOT
[facility]    [address] [address]    Phone:  [phone]                                       Jordi Kim   MRN: [id number]    Department[facility]  [facility]   Date of Visit:  8/9/2017           After Visit Summary Signature Page     I have received my discharge instructions, and my questions have been answered. I have discussed any challenges I see with this plan with the nurse or doctor.    ..........................................................................................................................................  Patient/Patient Representative Signature      ..........................................................................................................................................  Patient Representative Print Name and Relationship to Patient    ..................................................               ................................................  Date                                            Time    ..........................................................................................................................................  Reviewed by Signature/Title    ...................................................              ..............................................  Date                                                            Time

## 2017-08-09 NOTE — ANESTHESIA POSTPROCEDURE EVALUATION
Patient: Jordi Kim    * No procedures listed *    Diagnosis:* No pre-op diagnosis entered *  Diagnosis Additional Information: No value filed.    Anesthesia Type:  No value filed.    Note:  Anesthesia Post Evaluation    Patient location during evaluation: Bedside  Patient participation: Able to fully participate in evaluation  Level of consciousness: awake and alert  Pain management: satisfactory to patient  Airway patency: patent  Cardiovascular status: acceptable  Respiratory status: acceptable  Hydration status: acceptable  PONV: none     Anesthetic complications: None          Last vitals:  Vitals:    08/09/17 1003 08/09/17 1106   BP: 124/70 124/74   Resp: 18    Temp: 98.4  F (36.9  C)    SpO2: 100%          Electronically Signed By: KEANU Corrigan CRNA  August 9, 2017  11:17 AM

## 2017-08-09 NOTE — ED NOTES
Christine EDGE CRNA with blood patch. 20mls of blood drawn from L) AC using sterile technique and provided to CRNA for blood patch.

## 2017-08-09 NOTE — ED NOTES
Condition stable no distress, understands discharge instructions.  Prescription x 1 given to patient.  Discharged home.

## 2017-08-09 NOTE — ANESTHESIA PREPROCEDURE EVALUATION
Anesthesia Evaluation       history and physical reviewed .      No history of anesthetic complications          ROS/MED HX    ENT/Pulmonary:  - neg pulmonary ROS     Neurologic:  - neg neurologic ROS     Cardiovascular:  - neg cardiovascular ROS       METS/Exercise Tolerance:     Hematologic:         Musculoskeletal:         GI/Hepatic:  - neg GI/hepatic ROS       Renal/Genitourinary:         Endo:         Psychiatric:         Infectious Disease:         Malignancy:         Other:                     Physical Exam  Normal systems: cardiovascular, pulmonary and dental    Airway   Mallampati: II  TM distance: > 3 FB  Neck ROM: full  Mouth opening: > 3 cm    Dental     Cardiovascular       Pulmonary     Other findings: Lumbar puncture 2 days ago-currently has spinal headache with associated symptoms      neg OB ROS                 Anesthesia Plan      History & Physical Review      ASA Status:  .  OB Epidural Asa: 1       Plan for     Discussed risks and benefits with patient including itching, sore back, infection, hematoma, spinal headache, CV complications, inability to place, nerve damage. Pt wishes to proceed.       Postoperative Care      Consents  Anesthetic plan, risks, benefits and alternatives discussed with:  Patient..                          .

## 2017-08-09 NOTE — ED NOTES
"Pt ambulatory to ED room 1 with c/o \"the worst headache he has ever had\" starting this AM. Pt denies n/v and states the pain increases with position changes. Pt states that the pain goes around to the back of his head. Pt had a LP performed on Monday for neck pain. Pt is alert and oriented. Pt denies numbness and tingling.   "

## 2017-08-11 LAB
A PHAGOCYTOPH DNA BLD QL NAA+PROBE: NOT DETECTED
E CHAFFEENSIS DNA BLD QL NAA+PROBE: NOT DETECTED
E EWINGII DNA SPEC QL NAA+PROBE: NOT DETECTED
EHRLICHIA DNA SPEC QL NAA+PROBE: NORMAL

## 2017-08-13 LAB
BACTERIA SPEC CULT: NORMAL
BACTERIA SPEC CULT: NORMAL
Lab: NORMAL
MICRO REPORT STATUS: NORMAL
MICRO REPORT STATUS: NORMAL
SPECIMEN SOURCE: NORMAL
SPECIMEN SOURCE: NORMAL

## 2017-08-14 LAB
BACTERIA SPEC CULT: NORMAL
MICRO REPORT STATUS: NORMAL
SPECIMEN SOURCE: NORMAL

## 2017-08-16 ENCOUNTER — HOSPITAL ENCOUNTER (EMERGENCY)
Facility: HOSPITAL | Age: 42
Discharge: HOME | End: 2017-08-16
Attending: PHYSICIAN ASSISTANT | Admitting: PHYSICIAN ASSISTANT
Payer: COMMERCIAL

## 2017-08-16 VITALS
SYSTOLIC BLOOD PRESSURE: 140 MMHG | RESPIRATION RATE: 16 BRPM | HEART RATE: 100 BPM | OXYGEN SATURATION: 98 % | TEMPERATURE: 98 F | DIASTOLIC BLOOD PRESSURE: 89 MMHG

## 2017-08-16 DIAGNOSIS — G51.0 FACIAL NERVE PALSY: ICD-10-CM

## 2017-08-16 PROCEDURE — 99283 EMERGENCY DEPT VISIT LOW MDM: CPT

## 2017-08-16 PROCEDURE — 36415 COLL VENOUS BLD VENIPUNCTURE: CPT

## 2017-08-16 PROCEDURE — 87798 DETECT AGENT NOS DNA AMP: CPT

## 2017-08-16 PROCEDURE — 99284 EMERGENCY DEPT VISIT MOD MDM: CPT | Performed by: PHYSICIAN ASSISTANT

## 2017-08-16 RX ORDER — VALACYCLOVIR HYDROCHLORIDE 1 G/1
1000 TABLET, FILM COATED ORAL 3 TIMES DAILY
Qty: 21 TABLET | Refills: 0 | Status: SHIPPED | OUTPATIENT
Start: 2017-08-16 | End: 2017-08-30

## 2017-08-16 RX ORDER — DOXYCYCLINE 100 MG/1
100 CAPSULE ORAL 2 TIMES DAILY
Qty: 42 CAPSULE | Refills: 0 | Status: SHIPPED | OUTPATIENT
Start: 2017-08-16 | End: 2017-09-06

## 2017-08-16 ASSESSMENT — ENCOUNTER SYMPTOMS
SHORTNESS OF BREATH: 0
CHEST TIGHTNESS: 0
NAUSEA: 0
NECK PAIN: 0
HEADACHES: 0
VOMITING: 0
APPETITE CHANGE: 0
LIGHT-HEADEDNESS: 0
BACK PAIN: 0
CHILLS: 0
PHOTOPHOBIA: 0
FATIGUE: 0
FACIAL SWELLING: 0
ACTIVITY CHANGE: 0
FEVER: 0
WEAKNESS: 0
NECK STIFFNESS: 0
SPEECH DIFFICULTY: 0
DIZZINESS: 0
NUMBNESS: 1
ABDOMINAL PAIN: 0

## 2017-08-16 NOTE — ED NOTES
Presents with co unable to close his left eye and left facial droop for 3 days. States he was seen here last week tested for lyme and other things, had a spinal tap and then came back with a spinal headache.  States no ct done, so he went to [facility] and was seen there and had a ct of his head. Denies headache at this time, states hes unable to work because he cant close his left eye. Call light in reach.

## 2017-08-16 NOTE — ED AVS SNAPSHOT
[facility]    [address] [address]    Phone:  [phone]                                       Jordi Kim   MRN: [id number]    Department:  [facility]   Date of Visit:  8/16/2017           Patient Information     Date Of Birth          1975        Your diagnoses for this visit were:     Facial nerve palsy        You were seen by Yordy Cummings PA-C.      Follow-up Information     Follow up with Ave Rush APRN CNP.    Specialty:  Family Practice    Why:  at 1:35pm for recheck     Contact information:    [address]  [place] [address] [phone]          Follow up with [facility].    Specialty:  EMERGENCY MEDICINE    Why:  If symptoms worsen    Contact information:    [address]  [place] [address] [phone]    Additional information:    From [place]: Take [address]. Turn left at [address]. Turn left at the first stoplight on [address] [place] [address]. At the first stop sign, take a right onto [address]. Take a left into the parking lot and continue through until you reach the North enterance of the building.       From [place]: Take [address]. Take the [address] ramp towards [place]. Turn left onto [address]. Take a slight right onto [address] [facility]. Turn left at the first stoplight on [address]. At the first stop sign, take a right onto [address]. Take a left into the parking lot and continue through until you reach the North enterance of the building.       From [place]: Take [address] South. Take a right at [address]. At the first stop sign, take a right onto [address]. Take a left into the parking lot and continue through until you reach the North enterance of the building.         Discharge Instructions         Bell s Palsy  Bell s palsy is a nerve disorder that usually happens suddenly and without warning. This  condition happens when a nerve that controls facial movement is damaged. Nerve damage can happen for many reasons. But most cases of Bell s palsy are probably caused by a virus.  Symptoms of Bell s palsy  Here are signs of the disorder:     Mild weakness to total paralysis of one side of your face    Drooping mouth, drooling on one side of mouth    Trouble closing one eye    Noises seeming louder than usual    Change in your sense of taste  When to go to the emergency department (ED)  There are conditions, such as stroke, that may look like Bell's palsy and are medical emergencies. Therefore, you should seek emergent medical care if you notice facial weakness or drooping. Although Bell s palsy can be alarming, it s rarely serious. Many people begin to improve in about 2 weeks, even without treatment. It is important to be seen as soon as possible. Most research shows that treatment with beneficial results was received within the first few days of symptoms.   Treatment  To treat Bell s palsy, you may be given steroid medicines. This helps reduce swelling of the affected nerve. In some cases, your healthcare provider may prescribe an antiviral medicine. Your open eye may be covered with a patch to prevent it from drying out. You also may need to use eye drops and ointments for a time. Your healthcare provider will discuss follow-up care with you, including the possible need for further treatment to help your facial muscles return to normal.  Date Last Reviewed: 10/7/2015    1125-2996 [employer]. [address] [place] [address]. All rights reserved. This information is not intended as a substitute for professional medical care. Always follow your healthcare professional's instructions.      Please use Saline eye drops to keep your left eye moist.      Valtrex and Doxycycline as prescribed.      Please return HERE for ANY worsening symptoms or other concerns.      Please follow-up with Ave  ROBIN Rush on Tuesday 8/22/17 at 1:35 for a recheck.     Future Appointments        Provider Department Dept Phone Center    8/22/2017 1:50 PM KEANU Stubbs [facility] [phone] [employer]         Review of your medicines      START taking        Dose / Directions Last dose taken    doxycycline 100 MG capsule   Commonly known as:  VIBRAMYCIN   Dose:  100 mg   Quantity:  42 capsule        Take 1 capsule (100 mg) by mouth 2 times daily for 21 days   Refills:  0        valACYclovir 1000 mg tablet   Commonly known as:  VALTREX   Dose:  1000 mg   Quantity:  21 tablet        Take 1 tablet (1,000 mg) by mouth 3 times daily   Refills:  0          Our records show that you are taking the medicines listed below. If these are incorrect, please call your family doctor or clinic.        Dose / Directions Last dose taken    IBUPROFEN PO   Dose:  800 mg        Take 800 mg by mouth every 6 hours as needed for moderate pain   Refills:  0                Prescriptions were sent or printed at these locations (2 Prescriptions)                   [facility] - [place], [place] - [address] [address] [facility] [address]   [address] , [facility] [address]    Telephone:  [phone]   Fax:  [phone]   Hours:                  E-Prescribed (2 of 2)         valACYclovir (VALTREX) 1000 mg tablet               doxycycline (VIBRAMYCIN) 100 MG capsule                Procedures and tests performed during your visit     Ehrlichia Anaplasma Sp by PCR      Orders Needing Specimen Collection     None      Pending Results     Date and Time Order Name Status Description    8/16/2017 1010 Ehrlichia Anaplasma Sp by PCR In process             Pending Culture Results     No orders found from 8/14/2017 to 8/17/2017.            Thank you for choosing [facility]       Thank you for choosing [facility] for your care. Our goal is always to provide you with excellent care. Hearing back from our patients is one  way we can continue to improve our services. Please take a few minutes to complete the written survey that you may receive in the mail after you visit with us. Thank you!        [employer] Information     [employer] gives you secure access to your electronic health record. If you see a primary care provider, you can also send messages to your care team and make appointments. If you have questions, please call your primary care clinic.  If you do not have a primary care provider, please call [phone] and they will assist you.        Care EveryWhere ID     This is your Care EveryWhere ID. This could be used by other organizations to access your [facility] medical records  [id number]        Equal Access to Services     MARGE CAMP : Cordell Norton, gina herbert, laney sepulveda, pradeep beaulieu. So [facility] [phone].    ATENCIÓN: Si habla español, tiene a lama disposición servicios gratuitos de asistencia lingüística. Llame al [phone].    We comply with applicable federal civil rights laws and [place] laws. We do not discriminate on the basis of race, color, national origin, age, disability sex, sexual orientation or gender identity.            After Visit Summary       This is your record. Keep this with you and show to your community pharmacist(s) and doctor(s) at your next visit.

## 2017-08-16 NOTE — ED NOTES
Patient has a positive FAST in triage (left sided facial numbness and droop)  Patient was roomed immediately and case discussed with MD.  No Stroke Code called per MD.  No other neuro deficits noted.

## 2017-08-16 NOTE — LETTER
[facility]  [address]  [place] [address]  Phone: [phone]    August 16, 2017        Jordi Kim  [address]  [place] [place] [facility] [place] [address]          To whom it may concern:    Jordi Kim was seen and treated at the [facility] ED on 8/16/17.  Please excuse him from work on 8/14-8/18 due to a medical condition.         Please contact me for questions or concerns.      Sincerely,              Yordy Cummings PA-C

## 2017-08-16 NOTE — ED AVS SNAPSHOT
[facility]    [address] [address]    Phone:  [phone]                                       Jordi Kim   MRN: [id number]    Department:  [facility]   Date of Visit:  8/16/2017           After Visit Summary Signature Page     I have received my discharge instructions, and my questions have been answered. I have discussed any challenges I see with this plan with the nurse or doctor.    ..........................................................................................................................................  Patient/Patient Representative Signature      ..........................................................................................................................................  Patient Representative Print Name and Relationship to Patient    ..................................................               ................................................  Date                                            Time    ..........................................................................................................................................  Reviewed by Signature/Title    ...................................................              ..............................................  Date                                                            Time

## 2017-08-16 NOTE — DISCHARGE INSTRUCTIONS
Bell s Palsy  Bell s palsy is a nerve disorder that usually happens suddenly and without warning. This condition happens when a nerve that controls facial movement is damaged. Nerve damage can happen for many reasons. But most cases of Bell s palsy are probably caused by a virus.  Symptoms of Bell s palsy  Here are signs of the disorder:     Mild weakness to total paralysis of one side of your face    Drooping mouth, drooling on one side of mouth    Trouble closing one eye    Noises seeming louder than usual    Change in your sense of taste  When to go to the emergency department (ED)  There are conditions, such as stroke, that may look like Bell's palsy and are medical emergencies. Therefore, you should seek emergent medical care if you notice facial weakness or drooping. Although Bell s palsy can be alarming, it s rarely serious. Many people begin to improve in about 2 weeks, even without treatment. It is important to be seen as soon as possible. Most research shows that treatment with beneficial results was received within the first few days of symptoms.   Treatment  To treat Bell s palsy, you may be given steroid medicines. This helps reduce swelling of the affected nerve. In some cases, your healthcare provider may prescribe an antiviral medicine. Your open eye may be covered with a patch to prevent it from drying out. You also may need to use eye drops and ointments for a time. Your healthcare provider will discuss follow-up care with you, including the possible need for further treatment to help your facial muscles return to normal.  Date Last Reviewed: 10/7/2015    6167-1040 [employer]. [address] [place] [address], [place] [address]. All rights reserved. This information is not intended as a substitute for professional medical care. Always follow your healthcare professional's instructions.      Please use Saline eye drops to keep your left eye moist.      Valtrex and Doxycycline as prescribed.       Please return HERE for ANY worsening symptoms or other concerns.      Please follow-up with Ave Rush NP on Tuesday 8/22/17 at 1:35 for a recheck.

## 2017-08-16 NOTE — ED PROVIDER NOTES
History     Chief Complaint   Patient presents with     Facial Droop     left sided x approximately 1 week     The history is provided by the patient.     Jordi Kim is a 41 year old male who presented to the ED ambulatory for evaluation of left sided facial numbness.  Mr. Kim reports that he was here on 8/7 for a febrile illness and underwent LP.  Abnormal serum inflammatory markers but otherwise negative work-up.  He presented here again with headache on 8/9 and underwent blood patching.  He then went to [facility] for symptoms on 8/11 and underwent negative CT and MRI.  Tells me that he began to experience left sided facial numbness on 5 days ago and Sunday noticed left sided facial droop.  No other concerns.  Headache has resolved.      I have reviewed the Medications, Allergies, Past Medical and Surgical History, and Social History in the Epic system.    Allergies: No Known Allergies      No current facility-administered medications on file prior to encounter.   Current Outpatient Prescriptions on File Prior to Encounter:  IBUPROFEN PO Take 800 mg by mouth every 6 hours as needed for moderate pain       There is no problem list on file for this patient.      History reviewed. No pertinent surgical history.    Social History   Substance Use Topics     Smoking status: Current Every Day Smoker     Packs/day: 0.12     Smokeless tobacco: Current User     Alcohol use No         There is no immunization history on file for this patient.    BMI: There is no height or weight on file to calculate BMI.      Review of Systems   Constitutional: Negative for activity change, appetite change, chills, fatigue and fever.   HENT: Negative for dental problem, ear pain, facial swelling and hearing loss.    Eyes: Negative for photophobia.        Unable to close left eye    Respiratory: Negative for chest tightness and shortness of breath.    Cardiovascular: Negative for chest pain.   Gastrointestinal: Negative for  abdominal pain, nausea and vomiting.   Genitourinary: Negative.    Musculoskeletal: Negative for back pain, neck pain and neck stiffness.   Skin: Negative.    Neurological: Positive for numbness. Negative for dizziness, speech difficulty, weakness, light-headedness and headaches.       Physical Exam   BP: 145/93  Heart Rate: 115  Temp: 97.2  F (36.2  C)  Resp: 16  SpO2: 98 %  Physical Exam   Constitutional: He is oriented to person, place, and time. He appears well-developed and well-nourished. No distress.   HENT:   Head is atraumatic and normocephalic.  External auditory canals are clear and without edema or erythema.  TMs are pearly gray and unremarkable. Posterior pharynx has no swelling, erythema, or exudate.  Oral mucosa is pink and moist, without petechia, lesions, or ulcer.  Tongue is midline.  Palate rises symmetric.    Eyes: Conjunctivae and EOM are normal. Pupils are equal, round, and reactive to light.   Unable to close the left eyelid    Neck: Normal range of motion. Neck supple.   Cardiovascular: Normal rate and regular rhythm.    Pulmonary/Chest: Effort normal.   Abdominal: There is no tenderness. There is no guarding.   Neurological: He is alert and oriented to person, place, and time.   Neurological examination:  That the patient was awake and alert, the attention, orientation, concentration, language, memory and fund of knowledge were all normal.  The patient had no neglect or apraxia.    Cranial nerve examination: revealed that for cranial nerve   II: the pupils were reactive and the visual field were full  III, IV, and VI, the extraocular movements were full.    V: facial sensation intact bilateral   VII: facial movements show loss of the left nasolabial fold, inability to wrinkle left sided of forehead, inability to close left eyelid, and left sided facial droop.  VIII: hearing intact to voice  IX & X: the soft palate rises symmetrically   XI: shoulder movements are symmetric  XII: tongue is  midline    Normal speech.  Normal finger to nose.  Normal rapid finger movement.  Normal strength in all 4 extremities  Normal gait    Skin: Skin is warm and dry.   Psychiatric: He has a normal mood and affect.   Nursing note and vitals reviewed.      ED Course     ED Course     Procedures             Critical Care time:  none               Labs Ordered and Resulted from Time of ED Arrival Up to the Time of Departure from the ED   EHRLICHIA ANAPLASMA SP BY PCR       Assessments & Plan (with Medical Decision Making)   Exam quite consistent with left facial nerve palsy.  Symptoms have been present for approx 5 days.  This would actually be his 4th visit in 10 days.  He had a rather large work-up here and also at [facility].  Negative CT head and MRI head on the 11th.  With his recent febrile illness I believe that the benefit of treating him with Doxycycline and Valtrex outweighs any perceived risks.  Questionable tick borne illness or false negative lyme. He is to return HERE for ANY worsening symptoms.  No work for this week due to inability to close eye.  I set him for a follow-up next week in the clinic.  See discharge instructions. Mr. Kim voiced complete understanding and was agreeable.     I have reviewed the nursing notes.    I have reviewed the findings, diagnosis, plan and need for follow up with the patient.       Discharge Medication List as of 8/16/2017 11:01 AM      START taking these medications    Details   valACYclovir (VALTREX) 1000 mg tablet Take 1 tablet (1,000 mg) by mouth 3 times daily, Disp-21 tablet, R-0, E-Prescribe      doxycycline (VIBRAMYCIN) 100 MG capsule Take 1 capsule (100 mg) by mouth 2 times daily for 21 days, Disp-42 capsule, R-0, E-Prescribe             Final diagnoses:   Facial nerve palsy       8/16/2017   [facility]     Yordy Cummings PA-C  08/16/17 [id number]

## 2017-08-16 NOTE — ED NOTES
Patient presents to the emergency room with left sided facial numbness and droop.  Symptoms have been ongoing x approximately 1 week.  Unable to close left eye.

## 2017-08-18 LAB
A PHAGOCYTOPH DNA BLD QL NAA+PROBE: NOT DETECTED
E CHAFFEENSIS DNA BLD QL NAA+PROBE: NOT DETECTED
E EWINGII DNA SPEC QL NAA+PROBE: NOT DETECTED
EHRLICHIA DNA SPEC QL NAA+PROBE: NOT DETECTED

## 2017-08-22 ENCOUNTER — OFFICE VISIT (OUTPATIENT)
Dept: FAMILY MEDICINE | Facility: OTHER | Age: 42
End: 2017-08-22
Attending: NURSE PRACTITIONER
Payer: COMMERCIAL

## 2017-08-22 VITALS
DIASTOLIC BLOOD PRESSURE: 66 MMHG | BODY MASS INDEX: 20.58 KG/M2 | TEMPERATURE: 99 F | HEIGHT: 76 IN | RESPIRATION RATE: 16 BRPM | HEART RATE: 85 BPM | WEIGHT: 169 LBS | SYSTOLIC BLOOD PRESSURE: 110 MMHG | OXYGEN SATURATION: 98 %

## 2017-08-22 DIAGNOSIS — G51.0 FACIAL PARALYSIS ON LEFT SIDE: Primary | ICD-10-CM

## 2017-08-22 LAB
BASOPHILS # BLD AUTO: 0.1 10E9/L (ref 0–0.2)
BASOPHILS NFR BLD AUTO: 1.4 %
CRP SERPL-MCNC: <2.9 MG/L (ref 0–8)
DIFFERENTIAL METHOD BLD: NORMAL
EOSINOPHIL # BLD AUTO: 0.2 10E9/L (ref 0–0.7)
EOSINOPHIL NFR BLD AUTO: 3 %
ERYTHROCYTE [DISTWIDTH] IN BLOOD BY AUTOMATED COUNT: 12.6 % (ref 10–15)
HCT VFR BLD AUTO: 40.5 % (ref 40–53)
HGB BLD-MCNC: 14.3 G/DL (ref 13.3–17.7)
IMM GRANULOCYTES # BLD: 0 10E9/L (ref 0–0.4)
IMM GRANULOCYTES NFR BLD: 0.3 %
LYMPHOCYTES # BLD AUTO: 2.6 10E9/L (ref 0.8–5.3)
LYMPHOCYTES NFR BLD AUTO: 37.7 %
MCH RBC QN AUTO: 30.4 PG (ref 26.5–33)
MCHC RBC AUTO-ENTMCNC: 35.3 G/DL (ref 31.5–36.5)
MCV RBC AUTO: 86 FL (ref 78–100)
MONOCYTES # BLD AUTO: 0.7 10E9/L (ref 0–1.3)
MONOCYTES NFR BLD AUTO: 10 %
NEUTROPHILS # BLD AUTO: 3.3 10E9/L (ref 1.6–8.3)
NEUTROPHILS NFR BLD AUTO: 47.6 %
NRBC # BLD AUTO: 0 10*3/UL
NRBC BLD AUTO-RTO: 0 /100
PLATELET # BLD AUTO: 362 10E9/L (ref 150–450)
RBC # BLD AUTO: 4.7 10E12/L (ref 4.4–5.9)
WBC # BLD AUTO: 7 10E9/L (ref 4–11)

## 2017-08-22 PROCEDURE — 85025 COMPLETE CBC W/AUTO DIFF WBC: CPT | Performed by: NURSE PRACTITIONER

## 2017-08-22 PROCEDURE — 36415 COLL VENOUS BLD VENIPUNCTURE: CPT | Performed by: NURSE PRACTITIONER

## 2017-08-22 PROCEDURE — 86140 C-REACTIVE PROTEIN: CPT | Performed by: NURSE PRACTITIONER

## 2017-08-22 PROCEDURE — 99000 SPECIMEN HANDLING OFFICE-LAB: CPT | Performed by: NURSE PRACTITIONER

## 2017-08-22 PROCEDURE — 86618 LYME DISEASE ANTIBODY: CPT | Mod: 90 | Performed by: NURSE PRACTITIONER

## 2017-08-22 PROCEDURE — 99214 OFFICE O/P EST MOD 30 MIN: CPT | Performed by: NURSE PRACTITIONER

## 2017-08-22 RX ORDER — PREDNISONE 20 MG/1
40 TABLET ORAL 2 TIMES DAILY
Qty: 20 TABLET | Refills: 0 | Status: SHIPPED | OUTPATIENT
Start: 2017-08-22 | End: 2017-08-27

## 2017-08-22 ASSESSMENT — PAIN SCALES - GENERAL: PAINLEVEL: MILD PAIN (3)

## 2017-08-22 ASSESSMENT — ANXIETY QUESTIONNAIRES
1. FEELING NERVOUS, ANXIOUS, OR ON EDGE: NOT AT ALL
2. NOT BEING ABLE TO STOP OR CONTROL WORRYING: NOT AT ALL
7. FEELING AFRAID AS IF SOMETHING AWFUL MIGHT HAPPEN: NOT AT ALL
4. TROUBLE RELAXING: SEVERAL DAYS
3. WORRYING TOO MUCH ABOUT DIFFERENT THINGS: NOT AT ALL
GAD7 TOTAL SCORE: 2
6. BECOMING EASILY ANNOYED OR IRRITABLE: SEVERAL DAYS
5. BEING SO RESTLESS THAT IT IS HARD TO SIT STILL: NOT AT ALL
IF YOU CHECKED OFF ANY PROBLEMS ON THIS QUESTIONNAIRE, HOW DIFFICULT HAVE THESE PROBLEMS MADE IT FOR YOU TO DO YOUR WORK, TAKE CARE OF THINGS AT HOME, OR GET ALONG WITH OTHER PEOPLE: NOT DIFFICULT AT ALL

## 2017-08-22 ASSESSMENT — PATIENT HEALTH QUESTIONNAIRE - PHQ9: SUM OF ALL RESPONSES TO PHQ QUESTIONS 1-9: 5

## 2017-08-22 NOTE — NURSING NOTE
"Chief Complaint   Patient presents with     Facial Droop       Initial /66 (BP Location: Right arm, Patient Position: Sitting, Cuff Size: Adult Regular)  Pulse 85  Temp 99  F (37.2  C) (Tympanic)  Resp 16  Ht 6' 4\" (1.93 m)  Wt 169 lb (76.7 kg)  SpO2 98%  BMI 20.57 kg/m2 Estimated body mass index is 20.57 kg/(m^2) as calculated from the following:    Height as of this encounter: 6' 4\" (1.93 m).    Weight as of this encounter: 169 lb (76.7 kg).  Medication Reconciliation: complete   Bethanie Lane      "

## 2017-08-22 NOTE — MR AVS SNAPSHOT
After Visit Summary   8/22/2017    Jordi Kim    MRN: [id number]           Patient Information     Date Of Birth          1975        Visit Information        Provider Department      8/22/2017 1:50 PM [address] Rush APRN [facility] [place]        Today's Diagnoses     Facial paralysis on left side    -  1      Care Instructions      Lyme Disease  Lyme disease is caused by bacteria. The infection is most often passed during the bite of a deer tick. The tick is very small, so many people with Lyme disease do not know they have been bitten. Tests for Lyme disease are not always accurate early in the disease. If the disease is suspected, treatment may begin before testing confirms the infection. A long course of antibiotics is the standard treatment.  If untreated, Lyme disease can worsen and full-body symptoms can develop         Early local symptoms may appear within a few days to a month after the tick bite. These symptoms may include a round, red rash that looks like a bull's-eye target with darker outer ring and a darker center. There may fever, chills, fatigue, body aches, and headache. In time, the rash goes away, even without treatment. That doesn't mean the infection has gone away, however. In some cases, early local symptoms never develop.    Early disseminated symptoms may appear weeks to months after the bite. These can include muscle aches, fatigue, fever, headache, stiff neck, and joint pain and swelling.    Late-stage symptoms include weakness in an arm, leg or one side of the face, headache, fever, and numbness and tingling in the arms or legs, confusion, and memory loss.  Testing is done for the presence of the bacteria. When the infection is treated early, it can be cured. In some cases, a second or third course of antibiotics may be needed. Be sure to follow your healthcare providers directions about treatment.  Home care  If oral antibiotics have been  prescribed, take them exactly as directed until they are completely gone. Do not stop taking them until you have taken the full course or your healthcare provider has told you to stop.  Ask your healthcare provider about taking over-the-counter medicines to control symptoms such as aches and fever.  Follow-up care  Follow up with your healthcare provider as advised. Be sure to return for follow-up testing as directed to be sure the infection has been treated.  When to seek medical advice  Call your healthcare provider right away if any of the following occur:    Current symptoms get worse    Unexplained fever, neck pain or stiffness, or headache    Arm, leg or facial weakness    Joint pain or swelling    Numbness and tingling in the arms or legs    Confusion or memory loss    Irregular or rapid heartbeat  Date Last Reviewed: 9/25/2015 2000-2017 [employer]. [address] [place] [address]. All rights reserved. This information is not intended as a substitute for professional medical care. Always follow your healthcare professional's instructions.                Follow-ups after your visit        Follow-up notes from your care team     Return in about 2 weeks (around 9/5/2017).      Your next 10 appointments already scheduled     Aug 30, 2017  3:20 PM CDT   (Arrive by 3:05 PM)   Office Visit with KEANU Sy CNP   [facility] ([facility] )    [address] [place] [address] Michelle Math[place] [id number] [phone]           Bring a current list of meds and any records pertaining to this visit. For Physicals, please bring immunization records and any forms needing to be filled out. Please arrive 10 minutes early to complete paperwork.              Who to contact     If you have questions or need follow up information about today's clinic visit or your schedule please contact [facility] directly at [phone].  Normal or non-critical lab and  "imaging results will be communicated to you by MyChart, letter or phone within 4 business days after the clinic has received the results. If you do not hear from us within 7 days, please contact the clinic through [employer] or phone. If you have a critical or abnormal lab result, we will notify you by phone as soon as possible.  Submit refill requests through [employer] or call your pharmacy and they will forward the refill request to us. Please allow 3 business days for your refill to be completed.          Additional Information About Your Visit        [employer] Information     [employer] gives you secure access to your electronic health record. If you see a primary care provider, you can also send messages to your care team and make appointments. If you have questions, please call your primary care clinic.  If you do not have a primary care provider, please call [phone] and they will assist you.        Care EveryWhere ID     This is your Care EveryWhere ID. This could be used by other organizations to access your [place] medical records  [id number]        Your Vitals Were     Pulse Temperature Respirations Height Pulse Oximetry BMI (Body Mass Index)    85 99  F (37.2  C) (Tympanic) 16 6' 4\" (1.93 m) 98% 20.57 kg/m2       Blood Pressure from Last 3 Encounters:   08/22/17 110/66   08/16/17 140/89   08/09/17 133/79    Weight from Last 3 Encounters:   08/22/17 169 lb (76.7 kg)   08/09/17 170 lb (77.1 kg)   08/07/17 180 lb (81.6 kg)              We Performed the Following     CBC with platelets and differential     CRP, inflammation     Lyme Disease Brittaney with reflex to WB Serum        Primary Care Provider Office Phone # Fax #    KEANU Sy [facility] [phone]       [address] [place] [address]        Equal Access to Services     MARGE CAMP AH: Hadii timbo lujano Sotangali, waaxda luqadaha, qaybta kaalmada adeconstanza, pradeep beaulieu. So wavince " [phone].    ATENCIÓN: Si yocasta bose, tiene a lama disposición servicios gratuitos de asistencia lingüística. Irina stovall [phone].    We comply with applicable federal civil rights laws and [place] laws. We do not discriminate on the basis of race, color, national origin, age, disability sex, sexual orientation or gender identity.            Thank you!     Thank you for choosing [facility]  for your [facility]. Our goal is always to provide you with excellent care. Hearing back from our patients is one way we can continue to improve our services. Please take a few minutes to complete the written survey that you may receive in the mail after your visit with us. Thank you!             Your Updated Medication List - Protect others around you: Learn how to safely use, store and throw away your medicines at www.disposemymeds.org.          This list is accurate as of: 8/22/17  2:51 PM.  Always use your most recent med list.                   Brand Name Dispense Instructions for use Diagnosis    doxycycline 100 MG capsule    VIBRAMYCIN    42 capsule    Take 1 capsule (100 mg) by mouth 2 times daily for 21 days        IBUPROFEN PO      Take 800 mg by mouth every 6 hours as needed for moderate pain        valACYclovir 1000 mg tablet    VALTREX    21 tablet    Take 1 tablet (1,000 mg) by mouth 3 times daily

## 2017-08-22 NOTE — PATIENT INSTRUCTIONS
Lyme Disease  Lyme disease is caused by bacteria. The infection is most often passed during the bite of a deer tick. The tick is very small, so many people with Lyme disease do not know they have been bitten. Tests for Lyme disease are not always accurate early in the disease. If the disease is suspected, treatment may begin before testing confirms the infection. A long course of antibiotics is the standard treatment.  If untreated, Lyme disease can worsen and full-body symptoms can develop         Early local symptoms may appear within a few days to a month after the tick bite. These symptoms may include a round, red rash that looks like a bull's-eye target with darker outer ring and a darker center. There may fever, chills, fatigue, body aches, and headache. In time, the rash goes away, even without treatment. That doesn't mean the infection has gone away, however. In some cases, early local symptoms never develop.    Early disseminated symptoms may appear weeks to months after the bite. These can include muscle aches, fatigue, fever, headache, stiff neck, and joint pain and swelling.    Late-stage symptoms include weakness in an arm, leg or one side of the face, headache, fever, and numbness and tingling in the arms or legs, confusion, and memory loss.  Testing is done for the presence of the bacteria. When the infection is treated early, it can be cured. In some cases, a second or third course of antibiotics may be needed. Be sure to follow your healthcare providers directions about treatment.  Home care  If oral antibiotics have been prescribed, take them exactly as directed until they are completely gone. Do not stop taking them until you have taken the full course or your healthcare provider has told you to stop.  Ask your healthcare provider about taking over-the-counter medicines to control symptoms such as aches and fever.  Follow-up care  Follow up with your healthcare provider as advised. Be sure to  return for follow-up testing as directed to be sure the infection has been treated.  When to seek medical advice  Call your healthcare provider right away if any of the following occur:    Current symptoms get worse    Unexplained fever, neck pain or stiffness, or headache    Arm, leg or facial weakness    Joint pain or swelling    Numbness and tingling in the arms or legs    Confusion or memory loss    Irregular or rapid heartbeat  Date Last Reviewed: 9/25/2015 2000-2017 [employer]. [address] [place] [address]. All rights reserved. This information is not intended as a substitute for professional medical care. Always follow your healthcare professional's instructions.

## 2017-08-22 NOTE — PROGRESS NOTES
SUBJECTIVE:   Jordi Kim is a 41 year old male who presents to clinic today for the following health issues:      ED/UC Followup:    Facility:    Date of visit: 8-11-17  and 8-7, 8-9 and 8-16 [place]  Reason for visit: facial drop, pain in back of head and back  Current Status: facial drop still present, on and off pain in back of head continues.  Was told to fu in one week.  Works as a  and fabricator.    Reviewed ER and  visits Negative head CT and MRI. Jordi does not have a repeat of the lyme test. Jordi stated he has used marijuana recently              Problem list and histories reviewed & adjusted, as indicated.  Additional history: as documented    There is no problem list on file for this patient.    Past Surgical History:   Procedure Laterality Date     GENITOURINARY SURGERY         Social History   Substance Use Topics     Smoking status: Current Every Day Smoker     Packs/day: 0.50     Smokeless tobacco: Current User     Types: Chew     Alcohol use No     History reviewed. No pertinent family history.      Current Outpatient Prescriptions   Medication Sig Dispense Refill     valACYclovir (VALTREX) 1000 mg tablet Take 1 tablet (1,000 mg) by mouth 3 times daily 21 tablet 0     doxycycline (VIBRAMYCIN) 100 MG capsule Take 1 capsule (100 mg) by mouth 2 times daily for 21 days 42 capsule 0     IBUPROFEN PO Take 800 mg by mouth every 6 hours as needed for moderate pain       No Known Allergies      Reviewed and updated as needed this visit by clinical staffTobacco  Allergies  Meds  Med Hx  Surg Hx  Fam Hx  Soc Hx      Reviewed and updated as needed this visit by Provider         ROS:  C: NEGATIVE for fever, chills, change in weight  I: NEGATIVE for worrisome rashes, moles or lesions  E: NEGATIVE for vision changes or irritation  ENT/MOUTH: left ear discomfort - same side as facial weakness  R: NEGATIVE for significant cough or SOB  CV: NEGATIVE for chest  "pain, palpitations or peripheral edema  GI: NEGATIVE for nausea, abdominal pain, heartburn, or change in bowel habits  : NEGATIVE for frequency, dysuria, or hematuria  M: NEGATIVE for significant arthralgias or myalgia  NEURO: drooping left side of face  E: NEGATIVE for temperature intolerance, skin/hair changes  H: NEGATIVE for bleeding problems  P: NEGATIVE for changes in mood or affect    OBJECTIVE:     /66 (BP Location: Right arm, Patient Position: Sitting, Cuff Size: Adult Regular)  Pulse 85  Temp 99  F (37.2  C) (Tympanic)  Resp 16  Ht 6' 4\" (1.93 m)  Wt 169 lb (76.7 kg)  SpO2 98%  BMI 20.57 kg/m2  Body mass index is 20.57 kg/(m^2).   GENERAL: healthy, alert and no distress  EYES: Eyes grossly normal to inspection, PERRL and left eye does not close without assistance  HENT: ear canals and TM's normal, nose and mouth without ulcers or lesions  NECK: no adenopathy, no asymmetry, masses, or scars and thyroid normal to palpation  RESP: lungs clear to auscultation - no rales, rhonchi or wheezes  CV: regular rate and rhythm, normal S1 S2, no S3 or S4, no murmur, click or rub, no peripheral edema and peripheral pulses strong  ABDOMEN: soft, nontender, no hepatosplenomegaly, no masses and bowel sounds normal  MS: extremities normal- no gross deformities noted  SKIN: no suspicious lesions or rashes  NEURO: alert and oriented, Normal strength and tone, sensory exam grossly normal, light touch normal, mentation intact, speech normal, gait normal  and left sided facial weakness/droop: tongue is midline, occular movement intact, left nasolabial fold loss of movement, inability to wrinkle left side of forehead, left side of mouth does not lift with smile   PSYCH: mentation appears normal, affect normal/bright  LYMPH: normal ant/post cervical, supraclavicular nodes    Diagnostic Test Results:  Results for orders placed or performed in visit on 08/22/17 (from the past 24 hour(s))   CRP, inflammation   Result " Value Ref Range    CRP Inflammation <2.9 0.0 - 8.0 mg/L   CBC with platelets and differential   Result Value Ref Range    WBC 7.0 4.0 - 11.0 10e9/L    RBC Count 4.70 4.4 - 5.9 10e12/L    Hemoglobin 14.3 13.3 - 17.7 g/dL    Hematocrit 40.5 40.0 - 53.0 %    MCV 86 78 - 100 fl    MCH 30.4 26.5 - 33.0 pg    MCHC 35.3 31.5 - 36.5 g/dL    RDW 12.6 10.0 - 15.0 %    Platelet Count 362 150 - 450 10e9/L    Diff Method Automated Method     % Neutrophils 47.6 %    % Lymphocytes 37.7 %    % Monocytes 10.0 %    % Eosinophils 3.0 %    % Basophils 1.4 %    % Immature Granulocytes 0.3 %    Nucleated RBCs 0 0 /100    Absolute Neutrophil 3.3 1.6 - 8.3 10e9/L    Absolute Lymphocytes 2.6 0.8 - 5.3 10e9/L    Absolute Monocytes 0.7 0.0 - 1.3 10e9/L    Absolute Eosinophils 0.2 0.0 - 0.7 10e9/L    Absolute Basophils 0.1 0.0 - 0.2 10e9/L    Abs Immature Granulocytes 0.0 0 - 0.4 10e9/L    Absolute Nucleated RBC 0.0      Lyme test pending    ASSESSMENT:       PLAN:   ASSESSMENT / PLAN:  (G51.0) Facial paralysis on left side  (primary encounter diagnosis)  Comment:   Plan:   -CRP, inflammation,   -CBC with platelets and differential,   -Lyme Disease Brittaney with reflex to WB Serum - pending  -predniSONE (DELTASONE) 20 MG tablet -   Jordi states his symptoms are improving except for the facial droop. Pain to the face, head and neck are about completely cleared. Jordi will complete the doxycycline 21 day course and valtrex.  Will try prednisone for a week and Jordi should follow up next week to monitor symptoms. If lyme test remains negative and symptoms continue for over a month or worsen again can consider neurology referral.            Follow up 2 weeks        KEANU Stubbs [facility]

## 2017-08-23 ASSESSMENT — ANXIETY QUESTIONNAIRES: GAD7 TOTAL SCORE: 2

## 2017-08-24 LAB — B BURGDOR IGG+IGM SER QL: 0.47 (ref 0–0.89)

## 2017-08-30 ENCOUNTER — OFFICE VISIT (OUTPATIENT)
Dept: FAMILY MEDICINE | Facility: OTHER | Age: 42
End: 2017-08-30
Attending: NURSE PRACTITIONER
Payer: COMMERCIAL

## 2017-08-30 VITALS
HEART RATE: 101 BPM | TEMPERATURE: 98.7 F | DIASTOLIC BLOOD PRESSURE: 60 MMHG | BODY MASS INDEX: 21.92 KG/M2 | HEIGHT: 76 IN | WEIGHT: 180 LBS | SYSTOLIC BLOOD PRESSURE: 120 MMHG

## 2017-08-30 DIAGNOSIS — G51.0 FACIAL PALSY: Primary | ICD-10-CM

## 2017-08-30 DIAGNOSIS — Z71.6 ENCOUNTER FOR SMOKING CESSATION COUNSELING: ICD-10-CM

## 2017-08-30 PROCEDURE — 99213 OFFICE O/P EST LOW 20 MIN: CPT | Performed by: NURSE PRACTITIONER

## 2017-08-30 ASSESSMENT — PATIENT HEALTH QUESTIONNAIRE - PHQ9
SUM OF ALL RESPONSES TO PHQ QUESTIONS 1-9: 0
5. POOR APPETITE OR OVEREATING: NOT AT ALL

## 2017-08-30 ASSESSMENT — ANXIETY QUESTIONNAIRES
1. FEELING NERVOUS, ANXIOUS, OR ON EDGE: NOT AT ALL
5. BEING SO RESTLESS THAT IT IS HARD TO SIT STILL: NOT AT ALL
3. WORRYING TOO MUCH ABOUT DIFFERENT THINGS: NOT AT ALL
7. FEELING AFRAID AS IF SOMETHING AWFUL MIGHT HAPPEN: NOT AT ALL
2. NOT BEING ABLE TO STOP OR CONTROL WORRYING: NOT AT ALL
IF YOU CHECKED OFF ANY PROBLEMS ON THIS QUESTIONNAIRE, HOW DIFFICULT HAVE THESE PROBLEMS MADE IT FOR YOU TO DO YOUR WORK, TAKE CARE OF THINGS AT HOME, OR GET ALONG WITH OTHER PEOPLE: NOT DIFFICULT AT ALL
GAD7 TOTAL SCORE: 1
6. BECOMING EASILY ANNOYED OR IRRITABLE: SEVERAL DAYS

## 2017-08-30 ASSESSMENT — PAIN SCALES - GENERAL: PAINLEVEL: NO PAIN (0)

## 2017-08-30 NOTE — PATIENT INSTRUCTIONS
Bell s Palsy  Bell s palsy is a nerve disorder that usually happens suddenly and without warning. This condition happens when a nerve that controls facial movement is damaged. Nerve damage can happen for many reasons. But most cases of Bell s palsy are probably caused by a virus.  Symptoms of Bell s palsy  Here are signs of the disorder:     Mild weakness to total paralysis of one side of your face    Drooping mouth, drooling on one side of mouth    Trouble closing one eye    Noises seeming louder than usual    Change in your sense of taste  When to go to the emergency department (ED)  There are conditions, such as stroke, that may look like Bell's palsy and are medical emergencies. Therefore, you should seek emergent medical care if you notice facial weakness or drooping. Although Bell s palsy can be alarming, it s rarely serious. Many people begin to improve in about 2 weeks, even without treatment. It is important to be seen as soon as possible. Most research shows that treatment with beneficial results was received within the first few days of symptoms.   Treatment  To treat Bell s palsy, you may be given steroid medicines. This helps reduce swelling of the affected nerve. In some cases, your healthcare provider may prescribe an antiviral medicine. Your open eye may be covered with a patch to prevent it from drying out. You also may need to use eye drops and ointments for a time. Your healthcare provider will discuss follow-up care with you, including the possible need for further treatment to help your facial muscles return to normal.  Date Last Reviewed: 10/7/2015    0484-4347 [employer]. [address] [place] [address], [place] [address]. All rights reserved. This information is not intended as a substitute for professional medical care. Always follow your healthcare professional's instructions.

## 2017-08-30 NOTE — PROGRESS NOTES
SUBJECTIVE:   Jordi Kim is a 41 year old male who presents to clinic today for the following health issues:      Facial Palsy      Duration: 3 weeks    Description (location/character/radiation): Left side of face    Intensity:  Improving - starting to get lines back on forehead and face is slightly higher and partially able to close his eye    Accompanying signs and symptoms: Facial Palsy    History (similar episodes/previous evaluation): None    Precipitating or alleviating factors: None    Therapies tried and outcome: prednisone - completed, continuing with the Doxycycline             Problem list and histories reviewed & adjusted, as indicated.  Additional history: as documented    There is no problem list on file for this patient.    Past Surgical History:   Procedure Laterality Date     GENITOURINARY SURGERY         Social History   Substance Use Topics     Smoking status: Current Every Day Smoker     Packs/day: 0.50     Smokeless tobacco: Current User     Types: Chew     Alcohol use No     History reviewed. No pertinent family history.      Current Outpatient Prescriptions   Medication Sig Dispense Refill     doxycycline (VIBRAMYCIN) 100 MG capsule Take 1 capsule (100 mg) by mouth 2 times daily for 21 days 42 capsule 0     IBUPROFEN PO Take 800 mg by mouth every 6 hours as needed for moderate pain       No Known Allergies      Reviewed and updated as needed this visit by clinical staffTobacco  Allergies  Meds  Med Hx  Surg Hx  Fam Hx  Soc Hx      Reviewed and updated as needed this visit by Provider         ROS:  C: NEGATIVE for fever, chills, change in weight  INTEGUMENTARY/SKIN: NEGATIVE for worrisome rashes, moles or lesions  E/M: NEGATIVE for ear, mouth and throat problems  R: NEGATIVE for significant cough or SOB  CV: NEGATIVE for chest pain, palpitations or peripheral edema  MUSCULOSKELETAL: mild left knee pain today   NEURO: improving facial palsy    OBJECTIVE:     /60 (BP Location:  "Right arm, Patient Position: Chair, Cuff Size: Adult Regular)  Pulse 101  Temp 98.7  F (37.1  C) (Tympanic)  Ht 6' 4\" (1.93 m)  Wt 180 lb (81.6 kg)  BMI 21.91 kg/m2  Body mass index is 21.91 kg/(m^2).   GENERAL: healthy, alert and no distress  RESP: lungs clear to auscultation - no rales, rhonchi or wheezes  CV: regular rate and rhythm, normal S1 S2, no S3 or S4, no murmur, click or rub, no peripheral edema and peripheral pulses strong  NEURO: Normal strength and tone, mentation intact and left side of face remains slightly weaker - can partially close left eye, starting to get lines across left side of forehead, cheek starting to form on the left side and mouth just below midline on the left    Diagnostic Test Results:  Results for orders placed or performed in visit on 08/22/17   CRP, inflammation   Result Value Ref Range    CRP Inflammation <2.9 0.0 - 8.0 mg/L   CBC with platelets and differential   Result Value Ref Range    WBC 7.0 4.0 - 11.0 10e9/L    RBC Count 4.70 4.4 - 5.9 10e12/L    Hemoglobin 14.3 13.3 - 17.7 g/dL    Hematocrit 40.5 40.0 - 53.0 %    MCV 86 78 - 100 fl    MCH 30.4 26.5 - 33.0 pg    MCHC 35.3 31.5 - 36.5 g/dL    RDW 12.6 10.0 - 15.0 %    Platelet Count 362 150 - 450 10e9/L    Diff Method Automated Method     % Neutrophils 47.6 %    % Lymphocytes 37.7 %    % Monocytes 10.0 %    % Eosinophils 3.0 %    % Basophils 1.4 %    % Immature Granulocytes 0.3 %    Nucleated RBCs 0 0 /100    Absolute Neutrophil 3.3 1.6 - 8.3 10e9/L    Absolute Lymphocytes 2.6 0.8 - 5.3 10e9/L    Absolute Monocytes 0.7 0.0 - 1.3 10e9/L    Absolute Eosinophils 0.2 0.0 - 0.7 10e9/L    Absolute Basophils 0.1 0.0 - 0.2 10e9/L    Abs Immature Granulocytes 0.0 0 - 0.4 10e9/L    Absolute Nucleated RBC 0.0    Lyme Disease Brittaney with reflex to WB Serum   Result Value Ref Range    Lyme Disease Antibodies Serum 0.47 0.00 - 0.89       ASSESSMENT:       PLAN:   ASSESSMENT / PLAN:  (G51.0) Facial palsy  (primary encounter " diagnosis)  Comment: improving  Plan:  Facial palsy is improving. Jordi is aware it can take months to years to completely improve. Noting some improvement at this time from last visit. If symptoms worsen, develops a headache or any concerns Jordi is encouraged to follow up.    (Z71.6,  Z72.0) Encounter for smoking cessation counseling  Comment:   Plan:  Jordi is not interested at this time. He is encouraged to stop smoking with the symptoms of the facial palsy.        Tobacco Cessation:   reports that he has been smoking.  He has been smoking about 0.50 packs per day. His smokeless tobacco use includes Chew.  Tobacco Cessation Action Plan: Information offered: Patient not interested at this time        See Patient Instructions    KEANU Stubbs [facility]

## 2017-08-30 NOTE — NURSING NOTE
"Chief Complaint   Patient presents with     Follow Up For     Facial Palsy       Initial /60 (BP Location: Right arm, Patient Position: Chair, Cuff Size: Adult Regular)  Pulse 101  Temp 98.7  F (37.1  C) (Tympanic)  Ht 6' 4\" (1.93 m)  Wt 180 lb (81.6 kg)  BMI 21.91 kg/m2 Estimated body mass index is 21.91 kg/(m^2) as calculated from the following:    Height as of this encounter: 6' 4\" (1.93 m).    Weight as of this encounter: 180 lb (81.6 kg).  Medication Reconciliation: complete   EDDI ALICIA      "

## 2017-08-30 NOTE — MR AVS SNAPSHOT
After Visit Summary   8/30/2017    Jordi Kim    MRN: [id number]           Patient Information     Date Of Birth          1975        Visit Information        Provider Department      8/30/2017 3:20 PM [address] Rush APRN [facility] [place]        Today's Diagnoses     Facial palsy    -  1    Encounter for smoking cessation counseling          Care Instructions      Bell s Palsy  Bell s palsy is a nerve disorder that usually happens suddenly and without warning. This condition happens when a nerve that controls facial movement is damaged. Nerve damage can happen for many reasons. But most cases of Bell s palsy are probably caused by a virus.  Symptoms of Bell s palsy  Here are signs of the disorder:     Mild weakness to total paralysis of one side of your face    Drooping mouth, drooling on one side of mouth    Trouble closing one eye    Noises seeming louder than usual    Change in your sense of taste  When to go to the emergency department (ED)  There are conditions, such as stroke, that may look like Bell's palsy and are medical emergencies. Therefore, you should seek emergent medical care if you notice facial weakness or drooping. Although Bell s palsy can be alarming, it s rarely serious. Many people begin to improve in about 2 weeks, even without treatment. It is important to be seen as soon as possible. Most research shows that treatment with beneficial results was received within the first few days of symptoms.   Treatment  To treat Bell s palsy, you may be given steroid medicines. This helps reduce swelling of the affected nerve. In some cases, your healthcare provider may prescribe an antiviral medicine. Your open eye may be covered with a patch to prevent it from drying out. You also may need to use eye drops and ointments for a time. Your healthcare provider will discuss follow-up care with you, including the possible need for further treatment to help your  "facial muscles return to normal.  Date Last Reviewed: 10/7/2015    7531-4920 [employer]. [address] [place] [address], [place] [address]. All rights reserved. This information is not intended as a substitute for professional medical care. Always follow your healthcare professional's instructions.                Follow-ups after your visit        Who to contact     If you have questions or need follow up information about today's clinic visit or your schedule please contact [facility] [place] directly at [phone].  Normal or non-critical lab and imaging results will be communicated to you by Navuthart, letter or phone within 4 business days after the clinic has received the results. If you do not hear from us within 7 days, please contact the clinic through [employer] or phone. If you have a critical or abnormal lab result, we will notify you by phone as soon as possible.  Submit refill requests through Fats[employer] or call your pharmacy and they will forward the refill request to us. Please allow 3 business days for your refill to be completed.          Additional Information About Your Visit        Navuthart Information     [employer] gives you secure access to your electronic health record. If you see a primary care provider, you can also send messages to your care team and make appointments. If you have questions, please call your primary care clinic.  If you do not have a primary care provider, please call [phone] and they will assist you.        Care EveryWhere ID     This is your Care EveryWhere ID. This could be used by other organizations to access your [facility] medical records  [id number]        Your Vitals Were     Pulse Temperature Height BMI (Body Mass Index)          101 98.7  F (37.1  C) (Tympanic) 6' 4\" (1.93 m) 21.91 kg/m2         Blood Pressure from Last 3 Encounters:   08/30/17 120/60   08/22/17 110/66   08/16/17 140/89    Weight from Last 3 Encounters:   08/30/17 180 lb (81.6 kg) "   08/22/17 169 lb (76.7 kg)   08/09/17 170 lb (77.1 kg)              Today, you had the following     No orders found for display       Primary Care Provider Office Phone # Fax #    KEANU Sy [phone]       [address] [place] [address]        Equal Access to Services     [facility]: Hadii aad ku hadasho Soomaali, waaxda luqadaha, qaybta kaalmada adeegyada, waxay idiin hayaan adeeg kharash laaylin . So [facility] [phone].    ATENCIÓN: Si habla español, tiene a lama disposición servicios gratuitos de asistencia lingüística. Llame al [phone].    We comply with applicable federal civil rights laws and [place] laws. We do not discriminate on the basis of race, color, national origin, age, disability sex, sexual orientation or gender identity.            Thank you!     Thank you for choosing [facility]  for your care. Our goal is always to provide you with excellent care. Hearing back from our patients is one way we can continue to improve our services. Please take a few minutes to complete the written survey that you may receive in the mail after your visit with us. Thank you!             Your Updated Medication List - Protect others around you: Learn how to safely use, store and throw away your medicines at www.disposemymeds.org.          This list is accurate as of: 8/30/17  3:28 PM.  Always use your most recent med list.                   Brand Name Dispense Instructions for use Diagnosis    doxycycline 100 MG capsule    VIBRAMYCIN    42 capsule    Take 1 capsule (100 mg) by mouth 2 times daily for 21 days        IBUPROFEN PO      Take 800 mg by mouth every 6 hours as needed for moderate pain

## 2017-08-31 ASSESSMENT — ANXIETY QUESTIONNAIRES: GAD7 TOTAL SCORE: 1

## 2017-12-13 ENCOUNTER — APPOINTMENT (OUTPATIENT)
Dept: OCCUPATIONAL MEDICINE | Facility: OTHER | Age: 42
End: 2017-12-13

## 2017-12-13 PROCEDURE — 99000 SPECIMEN HANDLING OFFICE-LAB: CPT

## 2018-11-22 ENCOUNTER — HOSPITAL ENCOUNTER (EMERGENCY)
Facility: HOSPITAL | Age: 43
Discharge: HOME | End: 2018-11-22
Attending: PHYSICIAN ASSISTANT | Admitting: PHYSICIAN ASSISTANT

## 2018-11-22 VITALS
OXYGEN SATURATION: 99 % | TEMPERATURE: 98.2 F | RESPIRATION RATE: 16 BRPM | DIASTOLIC BLOOD PRESSURE: 92 MMHG | SYSTOLIC BLOOD PRESSURE: 137 MMHG

## 2018-11-22 DIAGNOSIS — M54.41 ACUTE RIGHT-SIDED LOW BACK PAIN WITH RIGHT-SIDED SCIATICA: ICD-10-CM

## 2018-11-22 DIAGNOSIS — M62.830 BACK MUSCLE SPASM: ICD-10-CM

## 2018-11-22 PROCEDURE — 96372 THER/PROPH/DIAG INJ SC/IM: CPT

## 2018-11-22 PROCEDURE — 25000132 ZZH RX MED GY IP 250 OP 250 PS 637: Performed by: PHYSICIAN ASSISTANT

## 2018-11-22 PROCEDURE — 25000128 H RX IP 250 OP 636: Performed by: PHYSICIAN ASSISTANT

## 2018-11-22 PROCEDURE — G0463 HOSPITAL OUTPT CLINIC VISIT: HCPCS | Mod: 25

## 2018-11-22 PROCEDURE — 99213 OFFICE O/P EST LOW 20 MIN: CPT | Performed by: PHYSICIAN ASSISTANT

## 2018-11-22 RX ORDER — KETOROLAC TROMETHAMINE 10 MG/1
10 TABLET, FILM COATED ORAL EVERY 6 HOURS PRN
Qty: 20 TABLET | Refills: 0 | Status: SHIPPED | OUTPATIENT
Start: 2018-11-22 | End: 2020-06-22

## 2018-11-22 RX ORDER — PREDNISONE 20 MG/1
TABLET ORAL
Qty: 8 TABLET | Refills: 0 | Status: SHIPPED | OUTPATIENT
Start: 2018-11-22 | End: 2020-06-22

## 2018-11-22 RX ORDER — DIAZEPAM 5 MG
10 TABLET ORAL ONCE
Status: COMPLETED | OUTPATIENT
Start: 2018-11-22 | End: 2018-11-22

## 2018-11-22 RX ORDER — CYCLOBENZAPRINE HCL 10 MG
TABLET ORAL
Qty: 20 TABLET | Refills: 0 | Status: SHIPPED | OUTPATIENT
Start: 2018-11-22 | End: 2020-06-22

## 2018-11-22 RX ORDER — ACETAMINOPHEN 325 MG/1
975 TABLET ORAL ONCE
Status: COMPLETED | OUTPATIENT
Start: 2018-11-22 | End: 2018-11-22

## 2018-11-22 RX ORDER — CYCLOBENZAPRINE HCL 10 MG
1 TABLET ORAL ONCE
Status: COMPLETED | OUTPATIENT
Start: 2018-11-22 | End: 2018-11-22

## 2018-11-22 RX ORDER — DEXAMETHASONE SODIUM PHOSPHATE 10 MG/ML
10 INJECTION, SOLUTION INTRAMUSCULAR; INTRAVENOUS ONCE
Status: COMPLETED | OUTPATIENT
Start: 2018-11-22 | End: 2018-11-22

## 2018-11-22 RX ORDER — KETOROLAC TROMETHAMINE 30 MG/ML
60 INJECTION, SOLUTION INTRAMUSCULAR; INTRAVENOUS ONCE
Status: COMPLETED | OUTPATIENT
Start: 2018-11-22 | End: 2018-11-22

## 2018-11-22 RX ADMIN — Medication 1 TABLET: at 18:43

## 2018-11-22 RX ADMIN — KETOROLAC TROMETHAMINE 60 MG: 30 INJECTION, SOLUTION INTRAMUSCULAR at 18:22

## 2018-11-22 RX ADMIN — DIAZEPAM 10 MG: 5 TABLET ORAL at 18:22

## 2018-11-22 RX ADMIN — ACETAMINOPHEN 975 MG: 325 TABLET, FILM COATED ORAL at 18:22

## 2018-11-22 RX ADMIN — DEXAMETHASONE SODIUM PHOSPHATE 10 MG: 10 INJECTION, SOLUTION INTRAMUSCULAR; INTRAVENOUS at 18:21

## 2018-11-22 ASSESSMENT — ENCOUNTER SYMPTOMS
CARDIOVASCULAR NEGATIVE: 1
PSYCHIATRIC NEGATIVE: 1
BACK PAIN: 1
NEUROLOGICAL NEGATIVE: 1
MYALGIAS: 1
RESPIRATORY NEGATIVE: 1
FEVER: 0
FATIGUE: 0

## 2018-11-22 NOTE — ED AVS SNAPSHOT
[facility]    [address] [address]    Phone:  [phone]                                       Jordi Kim   MRN: [id number]    Department:  [facility]   Date of Visit:  11/22/2018           After Visit Summary Signature Page     I have received my discharge instructions, and my questions have been answered. I have discussed any challenges I see with this plan with the nurse or doctor.    ..........................................................................................................................................  Patient/Patient Representative Signature      ..........................................................................................................................................  Patient Representative Print Name and Relationship to Patient    ..................................................               ................................................  Date                                   Time    ..........................................................................................................................................  Reviewed by Signature/Title    ...................................................              ..............................................  Date                                               Time          22EPIC Rev 08/18

## 2018-11-22 NOTE — ED AVS SNAPSHOT
[facility]    [address] [address]    Phone:  [phone]                                       Jordi Kim   MRN: [id number]    Department:  [facility]   Date of Visit:  11/22/2018           Patient Information     Date Of Birth          1975        Your diagnoses for this visit were:     Back muscle spasm     Acute right-sided low back pain with right-sided sciatica        You were seen by Yolie Chris PA.      Follow-up Information     Please follow up.    Why:  If symptoms worsen, with a Primary Care Provider or a Chiropractor        Follow up with [facility].    Specialty:  EMERGENCY MEDICINE    Why:  If further concerns develop    Contact information:    [address] [address] [phone]    Additional information:    From [facility]: Take [address]. Turn left at [address]. Turn left at the first stoplight on [address]. At the first stop sign, take a right onto [address]. Take a left into the parking lot and continue through until you reach the North enterance of the building.       From [place]: Take [address]. Take the [address] ramp towards [place]. Turn left onto [address]. Take a slight right onto [address]. Turn left at the first stoplight on [address]. At the first stop sign, take a right onto [address]. Take a left into the parking lot and continue through until you reach the North enterance of the building.       From [place]: Take [address]. Take a right at [address]. At the first stop sign, take a right onto [address]. Take a left into the parking lot and continue through until you reach the North enterance of the building.       Discharge References/Attachments     BACK SPASM, NO TRAUMA (ENGLISH)    BACK CARE TIPS (ENGLISH)    BACK SAFETY: POOR POSTURE HURTS (ENGLISH)         Review of your  medicines      START taking        Dose / Directions Last dose taken    cyclobenzaprine 10 MG tablet   Commonly known as:  FLEXERIL   Quantity:  20 tablet        Take half to one tablet every 8 hours as needed for muscle spasms   Refills:  0        ketorolac 10 MG tablet   Commonly known as:  TORADOL   Dose:  10 mg   Quantity:  20 tablet        Take 1 tablet (10 mg) by mouth every 6 hours as needed for moderate pain   Refills:  0        predniSONE 20 MG tablet   Commonly known as:  DELTASONE   Quantity:  8 tablet        Take two tablets (= 40mg) each day for 4  days   Refills:  0          Our records show that you are taking the medicines listed below. If these are incorrect, please call your family doctor or clinic.        Dose / Directions Last dose taken    IBUPROFEN PO   Dose:  800 mg        Take 800 mg by mouth every 6 hours as needed for moderate pain   Refills:  0                Prescriptions were sent or printed at these locations (3 Prescriptions)                   [facility] - [place], [place] - [address] AT [facility] [address]   [address], [place] [address]    Telephone:  [phone]   Fax:  [phone]   Hours:                  E-Prescribed (3 of 3)         cyclobenzaprine (FLEXERIL) 10 MG tablet               ketorolac (TORADOL) 10 MG tablet               predniSONE (DELTASONE) 20 MG tablet                Orders Needing Specimen Collection     None      Pending Results     No orders found from 11/20/2018 to 11/23/2018.            Pending Culture Results     No orders found from 11/20/2018 to 11/23/2018.            Thank you for choosing [facility]       Thank you for choosing [facility] for your care. Our goal is always to provide you with excellent care. Hearing back from our patients is one way we can continue to improve our services. Please take a few minutes to complete the written survey that you may receive in the mail after you visit with us. Thank you!        Emily  Information     [employer] gives you secure access to your electronic health record. If you see a primary care provider, you can also send messages to your care team and make appointments. If you have questions, please call your primary care clinic.  If you do not have a primary care provider, please call [phone] and they will assist you.        Care EveryWhere ID     This is your Care EveryWhere ID. This could be used by other organizations to access your [facility] medical records  [id number]        Equal Access to Services     [facility] : Hadii timbo lujano Sotangali, waaxda luqadaha, qaybta kaalmada adeconstanza, pradeep beaulieu. So [facility] [phone].    ATENCIÓN: Si habla español, tiene a lama disposición servicios gratuitos de asistencia lingüística. Llame al [phone].    We comply with applicable federal civil rights laws and [place] laws. We do not discriminate on the basis of race, color, national origin, age, disability, sex, sexual orientation, or gender identity.            After Visit Summary       This is your record. Keep this with you and show to your community pharmacist(s) and doctor(s) at your next visit.

## 2018-11-22 NOTE — LETTER
[facility]  [address]  [place] [address]  Phone: [phone]    November 22, 2018        Jordi Kim  [address]  [facility] [address]          To whom it may concern:    RE: Jordi Kim    Patient was seen and treated today at our clinic.          Sincerely,        Yolie Chris Certified  Physician Assistant  11/22/2018  6:41 PM  URGENT CARE CLINIC.

## 2018-11-23 NOTE — ED PROVIDER NOTES
History     Chief Complaint   Patient presents with     Back Pain     c/o chronic back pain notes worse over the last 4 days     The history is provided by the patient. No  was used.     Jordi Kim is a 43 year old male who has acute exacerbation of chronic right LBP that radiates down right buttock and right leg. Pt denies any direct injury or fall. No loss of leg strength. No loss of b/b control        Past Medical History:    LBP      Past Surgical History:    Past Surgical History:   Procedure Laterality Date     GENITOURINARY SURGERY         Family History:    No family history on file.    Social History:  Marital Status:  Single [1]  Social History   Substance Use Topics     Smoking status: Current Every Day Smoker     Packs/day: 0.50     Smokeless tobacco: Current User     Types: Chew     Alcohol use No        Medications:      cyclobenzaprine (FLEXERIL) 10 MG tablet   ketorolac (TORADOL) 10 MG tablet   predniSONE (DELTASONE) 20 MG tablet   IBUPROFEN PO         Review of Systems   Constitutional: Negative for fatigue and fever.   Respiratory: Negative.    Cardiovascular: Negative.    Genitourinary: Negative.    Musculoskeletal: Positive for back pain and myalgias.   Neurological: Negative.    Psychiatric/Behavioral: Negative.        Physical Exam   BP: 137/92  Heart Rate: 100  Temp: 98.2  F (36.8  C)  Resp: 16  SpO2: 99 %      Physical Exam   Constitutional: He is oriented to person, place, and time. He appears well-developed and well-nourished. No distress.   Neck: Normal range of motion. Neck supple.   Cardiovascular:   tachycardia   Pulmonary/Chest: Effort normal.   Musculoskeletal:   Back: No e/e/e/e, decreased ROM due to pain, 5/5 strength, m/n/v intact,  moderate TTP to si joint area and right lumbar paraspinous muscles    BLE: +AFROM, 5/5 strength, m/n/v intact   Neurological: He is alert and oriented to person, place, and time.   Skin: He is not diaphoretic.   Psychiatric: He  has a normal mood and affect.   Nursing note and vitals reviewed.      ED Course     ED Course     Procedures            No results found for this or any previous visit (from the past 24 hour(s)).    Medications   dexamethasone PF (DECADRON) injection 10 mg (10 mg Intramuscular Given 11/22/18 1821)   diazepam (VALIUM) tablet 10 mg (10 mg Oral Given 11/22/18 1822)   ketorolac (TORADOL) injection 60 mg (60 mg Intramuscular Given 11/22/18 1822)   acetaminophen (TYLENOL) tablet 975 mg (975 mg Oral Given 11/22/18 1822)   cyclobenzaprine (FLEXERIL) 3 tablet ed starter pack 1 tablet (1 tablet Oral Given 11/22/18 1843)     Pt observed x 20 minutes. Pt tolerated well.  Assessments & Plan (with Medical Decision Making)     I have reviewed the nursing notes.    I have reviewed the findings, diagnosis, plan and need for follow up with the patient.      Discharge Medication List as of 11/22/2018  6:36 PM      START taking these medications    Details   cyclobenzaprine (FLEXERIL) 10 MG tablet Take half to one tablet every 8 hours as needed for muscle spasms, Disp-20 tablet, R-0, E-Prescribe      ketorolac (TORADOL) 10 MG tablet Take 1 tablet (10 mg) by mouth every 6 hours as needed for moderate pain, Disp-20 tablet, R-0, E-Prescribe      predniSONE (DELTASONE) 20 MG tablet Take two tablets (= 40mg) each day for 4  days, Disp-8 tablet, R-0, E-Prescribe             Final diagnoses:   Back muscle spasm   Acute right-sided low back pain with right-sided sciatica         Patient verbally educated and given appropriate education sheets for the diagnoses and has no questions.  Take medications as directed.   Follow up with your Primary Care provider/Chiropractor if symptoms increase or if further concerns develop, return to the ER  Yolie Chris Certified  Physician Assistant  11/22/2018  7:32 PM  URGENT CARE CLINIC      11/22/2018   [facility]     Yolie Chris PA  11/22/18 1936

## 2020-02-18 ENCOUNTER — APPOINTMENT (OUTPATIENT)
Dept: OCCUPATIONAL MEDICINE | Facility: OTHER | Age: 45
End: 2020-02-18

## 2020-02-18 PROCEDURE — 99000 SPECIMEN HANDLING OFFICE-LAB: CPT

## 2020-02-21 ENCOUNTER — APPOINTMENT (OUTPATIENT)
Dept: OCCUPATIONAL MEDICINE | Facility: OTHER | Age: 45
End: 2020-02-21

## 2020-02-21 PROCEDURE — 97799 UNLISTED PHYSCL MED/REHAB PX: CPT

## 2020-02-21 PROCEDURE — 94010 BREATHING CAPACITY TEST: CPT

## 2020-02-21 PROCEDURE — 92552 PURE TONE AUDIOMETRY AIR: CPT

## 2020-02-21 PROCEDURE — 92499 UNLISTED OPH SVC/PROCEDURE: CPT

## 2020-02-21 PROCEDURE — 99080 SPECIAL REPORTS OR FORMS: CPT

## 2020-02-21 PROCEDURE — 99499 UNLISTED E&M SERVICE: CPT

## 2020-02-21 PROCEDURE — 99199 UNLISTED SPECIAL SVC PX/RPRT: CPT

## 2020-03-02 ENCOUNTER — HEALTH MAINTENANCE LETTER (OUTPATIENT)
Age: 45
End: 2020-03-02

## 2020-06-22 ENCOUNTER — HOSPITAL ENCOUNTER (EMERGENCY)
Facility: HOSPITAL | Age: 45
Discharge: HOME | End: 2020-06-22
Attending: EMERGENCY MEDICINE | Admitting: EMERGENCY MEDICINE
Payer: COMMERCIAL

## 2020-06-22 VITALS
SYSTOLIC BLOOD PRESSURE: 135 MMHG | OXYGEN SATURATION: 99 % | DIASTOLIC BLOOD PRESSURE: 91 MMHG | HEART RATE: 98 BPM | TEMPERATURE: 97 F | RESPIRATION RATE: 16 BRPM

## 2020-06-22 DIAGNOSIS — S51.811A LACERATION OF RIGHT FOREARM, INITIAL ENCOUNTER: ICD-10-CM

## 2020-06-22 PROCEDURE — 12001 RPR S/N/AX/GEN/TRNK 2.5CM/<: CPT

## 2020-06-22 PROCEDURE — 99282 EMERGENCY DEPT VISIT SF MDM: CPT | Mod: 25

## 2020-06-22 PROCEDURE — 12001 RPR S/N/AX/GEN/TRNK 2.5CM/<: CPT | Performed by: EMERGENCY MEDICINE
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
## 2020-06-22 NOTE — ED NOTES
Last tetanus in [facility] in 2011. Pt reports he had one a few years ago - does not know date of injection.

## 2020-06-22 NOTE — ED AVS SNAPSHOT
[facility]  [address] [address]  Phone:  [phone]                                    Jordi Kim   MRN: [id number]    Department:  [facility]   Date of Visit:  6/22/2020           After Visit Summary Signature Page    I have received my discharge instructions, and my questions have been answered. I have discussed any challenges I see with this plan with the nurse or doctor.    ..........................................................................................................................................  Patient/Patient Representative Signature      ..........................................................................................................................................  Patient Representative Print Name and Relationship to Patient    ..................................................               ................................................  Date                                   Time    ..........................................................................................................................................  Reviewed by Signature/Title    ...................................................              ..............................................  Date                                               Time          22EPIC Rev 08/18
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
## 2020-12-20 ENCOUNTER — HEALTH MAINTENANCE LETTER (OUTPATIENT)
Age: 45
End: 2020-12-20

## 2020-12-31 ENCOUNTER — NURSE TRIAGE (OUTPATIENT)
Dept: FAMILY MEDICINE | Facility: OTHER | Age: 45
End: 2020-12-31

## 2020-12-31 ENCOUNTER — OFFICE VISIT (OUTPATIENT)
Dept: FAMILY MEDICINE | Facility: OTHER | Age: 45
End: 2020-12-31
Payer: COMMERCIAL

## 2020-12-31 DIAGNOSIS — R52 BODY ACHES: Primary | ICD-10-CM

## 2020-12-31 DIAGNOSIS — R52 BODY ACHES: ICD-10-CM

## 2020-12-31 PROCEDURE — U0003 INFECTIOUS AGENT DETECTION BY NUCLEIC ACID (DNA OR RNA); SEVERE ACUTE RESPIRATORY SYNDROME CORONAVIRUS 2 (SARS-COV-2) (CORONAVIRUS DISEASE [COVID-19]), AMPLIFIED PROBE TECHNIQUE, MAKING USE OF HIGH THROUGHPUT TECHNOLOGIES AS DESCRIBED BY CMS-2020-01-R: HCPCS | Performed by: FAMILY MEDICINE

## 2020-12-31 NOTE — PROGRESS NOTES
Muscle pain and chills. Lower back pain that goes up to the back to the shoulder and into the head sharp and then it goes away when his muscles relax it goes away. COVID testing complete

## 2020-12-31 NOTE — TELEPHONE ENCOUNTER
"Discharge Instructions for COVID-19 Patients  You have--or may have--COVID-19. Please follow the instructions listed below.   If you have a weakened immune system, discuss with your doctor any other actions you need to take.  How can I protect others?  If you have symptoms (fever, cough, body aches or trouble breathing):    Stay home and away from others (self-isolate) until:  ? At least 10 days have passed since your symptoms started, And   ? You've had no fever--and no medicine that reduces fever--for 1 full day (24 hours), And    ? Your other symptoms have resolved (gotten better).  If you don't show symptoms, but testing showed that you have COVID-19:    Stay home and away from others (self-isolate). Follow the tips under \"How do I self-isolate?\" below for 10 days (20 days if you have a weak immune system).    You don't need to be retested for COVID-19 before going back to school or work. As long as you're fever-free and feeling better, you can go back to school, work and other activities after waiting the 10 or 20 days.   How do I self-isolate?    Stay in your own room, even for meals. Use your own bathroom if you can.    Stay away from others in your home. No hugging, kissing or shaking hands. No visitors.    Don't go to work, school or anywhere else.    Clean \"high touch\" surfaces often (doorknobs, counters, handles). Use household cleaning spray or wipes. You'll find a full list of  on the EPA website: www.epa.gov/pesticide-registration/list-n-disinfectants-use-against-sars-cov-2.    Cover your mouth and nose with a mask or other face covering to avoid spreading germs.    Wash your hands and face often. Use soap and water.    Caregivers in these groups are at risk for severe illness due to COVID-19:  ? People 65 years and older  ? People who live in a nursing home or long-term care facility  ? People with chronic disease (lung, heart, cancer, diabetes, kidney, liver, immunologic)  ? People who have a " weakened immune system, including those who:    Are in cancer treatment    Take medicine that weakens the immune system, such as corticosteroids    Had a bone marrow or organ transplant    Have an immune deficiency    Have poorly controlled HIV or AIDS    Are obese (body mass index of 40 or higher)    Smoke regularly    Caregivers should wear gloves while washing dishes, handling laundry and cleaning bedrooms and bathrooms.    Use caution when washing and drying laundry: Don't shake dirty laundry and use the warmest water setting that you can.    For more tips on managing your health at home, go to www.cdc.gov/coronavirus/2019-ncov/downloads/10Things.pdf.  How can I take care of myself at home?  1. Get lots of rest. Drink extra fluids (unless a doctor has told you not to).    2. Take Tylenol (acetaminophen) for fever or pain. If you have liver or kidney problems, ask your family doctor if it's okay to take Tylenol.     Adults can take either:  ? 650 mg (two 325 mg pills) every 4 to 6 hours, or   ? 1,000 mg (two 500 mg pills) every 8 hours as needed.  ? Note: Don't take more than 3,000 mg in one day. Acetaminophen is found in many medicines (both prescribed and over-the-counter medicines). Read all labels to be sure you don't take too much.   For children, check the Tylenol bottle for the right dose. The dose is based on the child's age or weight.  3. If you have other health problems (like cancer, heart failure, an organ transplant or severe kidney disease): Call your specialty clinic if you don't feel better in the next 2 days.    4. Know when to call 911. Emergency warning signs include:  ? Trouble breathing or shortness of breath  ? Pain or pressure in the chest that doesn't go away  ? Feeling confused like you haven't felt before, or not being able to wake up  ? Bluish-colored lips or face    5. Your doctor may have prescribed a blood thinner medicine. Follow their instructions.  Where can I get more  information?    [facility] - About COVID-19: [email or web address]    CDC - What to Do If You're Sick: www.cdc.gov/coronavirus/2019-ncov/about/steps-when-sick.html    CDC - Ending Home Isolation: www.cdc.gov/coronavirus/2019-ncov/hcp/disposition-in-home-patients.html    CDC - Caring for Someone: www.cdc.gov/coronavirus/2019-ncov/if-you-are-sick/care-for-someone.html    [facility] - Interim Guidance for Hospital Discharge to Home: www.[facility]/diseases/coronavirus/hcp/hospdischarge.pdf    [facility] clinical trials (COVID-19 research studies): clinicalaffairs.[facility]-clinical-trials    Below are the COVID-19 hotlines at the [place] [facility] ([facility]). Interpreters are available.  ? For health questions: Call 779-562-2210 or 1-271.881.1416 (7 a.m. to 7 p.m.)  ? For questions about schools and childcare: Call 192-640-7530 or 1-523.925.3376 (7 a.m. to 7 p.m.)    For informational purposes only. Not to replace the advice of your health care provider. Clinically reviewed by the Infection Prevention Team. Copyright   2020 [facility]. All rights reserved. [employer] [id number] - REV 08/04/20.      Resources    [facility]: About COVID-19: [email or web address]    CDC: What to Do If You're Sick: www.cdc.gov/coronavirus/2019-ncov/about/steps-when-sick.html    CDC: Ending Home Isolation: www.cdc.gov/coronavirus/2019-ncov/hcp/disposition-in-home-patients.html     CDC: Caring for Someone: www.cdc.gov/coronavirus/2019-ncov/if-you-are-sick/care-for-someone.html     [facility]: Interim Guidance for Hospital Discharge to Home: www.[facility]/diseases/coronavirus/hcp/hospdischarge.pdf    [facility] clinical trials (COVID-19 research studies): clinicalaffairs.[facility]/n-clinical-trials     Below are the COVID-19 hotlines at the [facility] of [facility] ([facility]). Interpreters are available.   o For health questions: Call 633-617-3775 or  1-704.112.7664 (7 a.m. to 7 p.m.)  o For questions about schools and childcare: Call 319-636-0140 or 1-716.362.5233 (7 a.m. to 7 p.m.)                     Instructions for Patients  It is recommended that you have a test for coronavirus (COVID-19). This illness can cause fever, cough and trouble breathing. Many people get a mild case and get better on their own. Some people can get very sick.     Please follow these steps:    1. We will call to schedule your test.  2. A member of our care team will ask you some questions. Then, they will use a swab to collect samples from your nose and throat.     Our testing team will send you your test results.    How can I protect others?    Stay home and away from others (self-isolate) until:    You ve had no fever--and no medicine that reduces fever--for 1 full day (24 hours). And      Your other symptoms have resolved (gotten better). For example, your cough or breathing has improved. And     At least 10 days have passed since your symptoms started.    Stay at least 6 feet away from others. (If someone will drive you to your test, stay in the backseat, as far away from the  as you can.)     Don t go to work, school or anywhere else. When it s time for your test, go straight to the testing site. Don t make any stops on the way there or back.     Wash your hands and face often. Use soap and water.     Cover your mouth and nose with a mask, tissue or washcloth.     Don t touch anyone. No hugging, kissing or handshakes.    How can I take care of myself?    1. Get lots of rest. Drink extra fluids (unless a doctor has told you not to).     2. Take Tylenol (acetaminophen) for fever or pain. If you have liver or kidney problems, ask your family doctor if it's okay to take Tylenol.     Adults can take either:     650 mg (two 325 mg pills) every 4 to 6 hours, or     1,000 mg (two 500 mg pills) every 8 hours as needed.     Note: Don't take more than 3,000 mg in one day.    Acetaminophen is found in many medicines (both prescribed and over-the-counter medicines). Read all labels to be sure you don't take too much.   For children, check the Tylenol bottle for the right dose. The dose is based on  the child's age or weight.    3. If you have other health problems (like cancer, heart failure, an organ transplant or severe kidney disease): Call your specialty clinic if you don't feel better in the next 2 days.    4. Know when to call 911: If your breathing is so bad that it keeps you from doing normal activities, call 911 or go to the emergency room. Tell them that you've been staying home and may have COVID-19.      Thank you for taking steps to prevent the spread of this virus.  o Limit your contact with others.  o Wear a simple mask to cover your cough.  o Wash your hands well and often.  o If you need medical care, go to [email or web address] or contact your health care provider.     For more about COVID-19 and caring for yourself at home, visit the CDC website at https://www.cdc.gov/coronavirus/2019-ncov/about/steps-when-sick.html.     To learn about care at [facility], please go to [email or web address].     [facility] clinical trials (COVID-19 research studies): clinicalaffairs.[facility]/n-clinical-trials.    Below are the COVID-19 hotlines at the [facility] of [facility] ([facility]). Interpreters are available.     For health questions: Call 183-612-6168 or 1-405.230.7733 (7 a.m. to 7 p.m.)    For questions about schools and childcare: Call 831-422-4380 or 1-419.917.4156 (7 a.m. to 7 p.m.)      COVID 19 Nurse Triage Plan/Patient Instructions    Please be aware that novel coronavirus (COVID-19) may be circulating in the community. If you develop symptoms such as fever, cough, or SOB or if you have concerns about the presence of another infection including coronavirus (COVID-19), please contact your health care provider or visit [email or web address].      Disposition/Instructions    Home care recommended. Follow home care protocol based instructions.    Thank you for taking steps to prevent the spread of this virus.  o Limit your contact with others.  o Wear a simple mask to cover your cough.  o Wash your hands well and often.    Resources    [facility]: About COVID-19: [email or web address]    CDC: What to Do If You're Sick: www.cdc.gov/coronavirus/2019-ncov/about/steps-when-sick.html    CDC: Ending Home Isolation: www.cdc.gov/coronavirus/2019-ncov/hcp/disposition-in-home-patients.html     CDC: Caring for Someone: www.cdc.gov/coronavirus/2019-ncov/if-you-are-sick/care-for-someone.html     [facility]: Interim Guidance for Hospital Discharge to Home: www.[facility].[place]/diseases/coronavirus/hcp/hospdischarge.pdf    [facility] clinical trials (COVID-19 research studies): clinicalaffairs.[facility]-clinical-trials     Below are the COVID-19 hotlines at the [place] [facility] ([facility]). Interpreters are available.   o For health questions: Call 744-536-7180 or 1-280.345.2113 (7 a.m. to 7 p.m.)  o For questions about schools and childcare: Call 944-169-7771 or 1-108.986.9348 (7 a.m. to 7 p.m.)                     Reason for Disposition    [1] COVID-19 infection suspected by caller or triager AND [2] mild symptoms (cough, fever, or others) AND [3] no complications or SOB    Additional Information    Negative: SEVERE difficulty breathing (e.g., struggling for each breath, speaks in single words)    Negative: Difficult to awaken or acting confused (e.g., disoriented, slurred speech)    Negative: Bluish (or gray) lips or face now    Negative: Shock suspected (e.g., cold/pale/clammy skin, too weak to stand, low BP, rapid pulse)    Negative: Sounds like a life-threatening emergency to the triager    Negative: [1] COVID-19 exposure AND [2] no symptoms    Negative: [1] Lives with someone known to have influenza (flu test positive) AND [2]  flu-like symptoms (e.g., cough, runny nose, sore throat, SOB; with or without fever)    Negative: [1] Adult with possible COVID-19 symptoms AND [2] triager concerned about severity of symptoms or other causes    Negative: Immunization reaction suspected (e.g., fever, headache, muscle aches occurring during days 1-3 days after immunization)    Negative: COVID-19 and breastfeeding, questions about    Negative: SEVERE or constant chest pain or pressure (Exception: mild central chest pain, present only when coughing)    Negative: MODERATE difficulty breathing (e.g., speaks in phrases, SOB even at rest, pulse 100-120)    Negative: [1] Headache AND [2] stiff neck (can't touch chin to chest)    Negative: MILD difficulty breathing (e.g., minimal/no SOB at rest, SOB with walking, pulse <100)    Negative: Chest pain or pressure    Negative: Patient sounds very sick or weak to the triager    Negative: Fever > 103 F (39.4 C)    Negative: [1] Fever > 101 F (38.3 C) AND [2] age > 60    Negative: [1] Fever > 100.0 F (37.8 C) AND [2] bedridden (e.g., nursing home patient, CVA, chronic illness, recovering from surgery)    Negative: [1] HIGH RISK patient (e.g., age > 64 years, diabetes, heart or lung disease, weak immune system) AND [2] new or worsening symptoms    Negative: [1] HIGH RISK patient AND [2] influenza is widespread in the community AND [3] ONE OR MORE respiratory symptoms: cough, sore throat, runny or stuffy nose    Negative: [1] HIGH RISK patient AND [2] influenza exposure within the last 7 days AND [3] ONE OR MORE respiratory symptoms: cough, sore throat, runny or stuffy nose    Negative: Fever present > 3 days (72 hours)    Negative: [1] Fever returns after gone for over 24 hours AND [2] symptoms worse or not improved    Negative: [1] Continuous (nonstop) coughing interferes with work or school AND [2] no improvement using cough treatment per protocol    Answer Assessment - Initial Assessment Questions  1. COVID-19  "DIAGNOSIS: \"Who made your Coronavirus (COVID-19) diagnosis?\" \"Was it confirmed by a positive lab test?\" If not diagnosed by a HCP, ask \"Are there lots of cases (community spread) where you live?\" (See public health department website, if unsure)        2. COVID-19 EXPOSURE: \"Was there any known exposure to COVID before the symptoms began?\" CDC Definition of close contact: within 6 feet (2 meters) for a total of 15 minutes or more over a 24-hour period.      No  3. ONSET: \"When did the COVID-19 symptoms start?\"       12.29.2020  4. WORST SYMPTOM: \"What is your worst symptom?\" (e.g., cough, fever, shortness of breath, muscle aches)      Lower back pain that goes up to the back to the shoulder and into the head sharp and then it goes away when his muscles relax it goes away.  5. COUGH: \"Do you have a cough?\" If so, ask: \"How bad is the cough?\"       no  6. FEVER: \"Do you have a fever?\" If so, ask: \"What is your temperature, how was it measured, and when did it start?\"     No  7. RESPIRATORY STATUS: \"Describe your breathing?\" (e.g., shortness of breath, wheezing, unable to speak)      no  8. BETTER-SAME-WORSE: \"Are you getting better, staying the same or getting worse compared to yesterday?\"  If getting worse, ask, \"In what way?\"     better  9. HIGH RISK DISEASE: \"Do you have any chronic medical problems?\" (e.g., asthma, heart or lung disease, weak immune system, obesity, etc.)   mild case of asthma no inhaler use  10. PREGNANCY: \"Is there any chance you are pregnant?\" \"When was your last menstrual period?\"        na  11. OTHER SYMPTOMS: \"Do you have any other symptoms?\"  (e.g., chills, fatigue, headache, loss of smell or taste, muscle pain, sore throat; new loss of smell or taste especially support the diagnosis of COVID-19)       Muscle pain and chills    Protocols used: CORONAVIRUS (COVID-19) DIAGNOSED OR [id number] 12.1    Advised inf back, neck and head pain worsen or worrisome he needs to go to  ED and he " verbalized understanding.      Trang Campos RN

## 2021-01-02 ENCOUNTER — TELEPHONE (OUTPATIENT)
Dept: FAMILY MEDICINE | Facility: OTHER | Age: 46
End: 2021-01-02

## 2021-01-02 LAB
LABORATORY COMMENT REPORT: ABNORMAL
SARS-COV-2 RNA SPEC QL NAA+PROBE: NORMAL
SARS-COV-2 RNA SPEC QL NAA+PROBE: POSITIVE
SPECIMEN SOURCE: ABNORMAL
SPECIMEN SOURCE: NORMAL

## 2021-01-02 NOTE — TELEPHONE ENCOUNTER
Coronavirus (COVID-19) Notification    Reason for call  Notify of POSITIVE  COVID-19 lab result, assess symptoms,  review [facility] recommendations    Lab Result   Lab test for 2019-nCoV rRt-PCR or SARS-COV-2 PCR  Oropharyngeal AND/OR nasopharyngeal swabs were POSITIVE for 2019-nCoV RNA [OR] SARS-COV-2 RNA (COVID-19) RNA     We have been unable to reach Patient by phone at this time to notify of their Positive COVID-19 result.  Left voicemail message requesting a call back to [phone] [facility] for results.        POSITIVE COVID-19 Letter sent.    Selina Schneider LPN

## 2021-04-18 ENCOUNTER — HEALTH MAINTENANCE LETTER (OUTPATIENT)
Age: 46
End: 2021-04-18

## 2021-10-03 ENCOUNTER — HEALTH MAINTENANCE LETTER (OUTPATIENT)
Age: 46
End: 2021-10-03

## 2022-05-14 ENCOUNTER — HEALTH MAINTENANCE LETTER (OUTPATIENT)
Age: 47
End: 2022-05-14

## 2022-09-04 ENCOUNTER — HEALTH MAINTENANCE LETTER (OUTPATIENT)
Age: 47
End: 2022-09-04

## 2023-06-03 ENCOUNTER — HEALTH MAINTENANCE LETTER (OUTPATIENT)
Age: 48
End: 2023-06-03